# Patient Record
Sex: MALE | Race: WHITE | Employment: OTHER | URBAN - METROPOLITAN AREA
[De-identification: names, ages, dates, MRNs, and addresses within clinical notes are randomized per-mention and may not be internally consistent; named-entity substitution may affect disease eponyms.]

---

## 2017-12-15 PROBLEM — D70.8 OTHER NEUTROPENIA (HCC): Status: ACTIVE | Noted: 2017-12-15

## 2018-12-18 PROBLEM — D61.818 PANCYTOPENIA (HCC): Status: ACTIVE | Noted: 2018-12-18

## 2018-12-21 ENCOUNTER — TELEPHONE (OUTPATIENT)
Dept: CARDIOLOGY CLINIC | Age: 73
End: 2018-12-21

## 2018-12-21 NOTE — TELEPHONE ENCOUNTER
Left message patients home voicemail to have him call to schedule a new patient appointment with Dr. Judy Rousseau per Dr. Taz Marquez. Atul Daniels.

## 2018-12-21 NOTE — TELEPHONE ENCOUNTER
Referred by Dr Shea Roman for a fib/flutter. Will have PSR contact and scheduled new patient appt with Dr Karishma Martell.

## 2019-02-05 ENCOUNTER — OFFICE VISIT (OUTPATIENT)
Dept: CARDIOLOGY CLINIC | Age: 74
End: 2019-02-05

## 2019-02-05 VITALS
WEIGHT: 175.8 LBS | DIASTOLIC BLOOD PRESSURE: 80 MMHG | RESPIRATION RATE: 14 BRPM | HEART RATE: 54 BPM | SYSTOLIC BLOOD PRESSURE: 126 MMHG | BODY MASS INDEX: 26.04 KG/M2 | HEIGHT: 69 IN | OXYGEN SATURATION: 97 %

## 2019-02-05 DIAGNOSIS — R42 DIZZINESS: ICD-10-CM

## 2019-02-05 DIAGNOSIS — I10 ESSENTIAL HYPERTENSION: ICD-10-CM

## 2019-02-05 DIAGNOSIS — R00.1 SINUS BRADYCARDIA BY ELECTROCARDIOGRAM: ICD-10-CM

## 2019-02-05 DIAGNOSIS — I48.0 PAF (PAROXYSMAL ATRIAL FIBRILLATION) (HCC): Primary | ICD-10-CM

## 2019-02-05 NOTE — PROGRESS NOTES
Cardiac Electrophysiology OFFICE Consultation Note     Subjective:      Christiane Field is a 74 y.o. patient who is seen for evaluation of paroxysmal atrial fibrillation. The patient was routinely seen by Dr. Argenis Irving in the office for annual check up and EKG showed atrial fibrillation. He felt some irregular heart beat and dizzy spells especially when he was outside in the hot weather. He is on Lisinopril for blood pressure control. Today, his rate is regular in sinus at 54 beats per minute. His wife is with him today. Dr. Argenis Irving kindly referred him to me. The patient's father had a stroke in the past but he does not know if he had that due to atrial fibrillation. He remembers his mother said his father was taking Coumadin at some point and it was difficult to recollect. Patient Active Problem List   Diagnosis Code    HTN (hypertension) I10    Panic disorder F41.0    Hyperlipidemia E78.5    BPH (benign prostatic hyperplasia) N40.0    Bilateral inguinal hernia K40.20    Bradycardia, sinus R00.1    Other neutropenia (HCC) D70.8    Pancytopenia (HCC) J30.783     Current Outpatient Medications   Medication Sig Dispense Refill    rivaroxaban (XARELTO) 20 mg tab tablet Take 1 Tab by mouth daily (with dinner). 90 Tab 1    tamsulosin (FLOMAX) 0.4 mg capsule Take 1 Cap by mouth daily. 30 Cap 11    sertraline (ZOLOFT) 100 mg tablet TAKE 1 TABLET BY MOUTH EVERY DAY 90 Tab 4    gentamicin (GARAMYCIN) 0.3 % ophthalmic solution Administer 1 Drop to both eyes every four (4) hours.  1 Bottle 1    sildenafil citrate (VIAGRA) 100 mg tablet TAKE ONE TABLET BY MOUTH AS NEEDED 3 Each 10     No Known Allergies  Past Medical History:   Diagnosis Date    Bilateral inguinal hernia 12/26/2013    Bradycardia, sinus 12/21/2015    HTN (hypertension) 9/24/2011    Hyperlipidemia 9/24/2011    Hypertension     Inguinal hernia     Other ill-defined conditions(799.89)     high cholesterol    Other ill-defined conditions(799.89)     benign prostatic hypertrophy    Other neutropenia ([facility] [address] 12/15/2017    Since  with neg JESSY and normal B12    Pancytopenia ([facility] [address] 2018    Panic disorder 2011     Past Surgical History:   Procedure Laterality Date    HX KNEE ARTHROSCOPY      HX UROLOGICAL      benign prostate bx     Family History   Problem Relation Age of Onset    Stroke Father          76 cva     Social History     Tobacco Use    Smoking status: Former Smoker    Smokeless tobacco: Never Used   Substance Use Topics    Alcohol use: Yes     Comment: moderate        Review of Systems:   Constitutional: Negative for fever, chills, weight loss, malaise/fatigue. HEENT: Negative for nosebleeds, vision changes. Respiratory: Negative for cough, hemoptysis  Cardiovascular: Negative for chest pain, + palpitations, dizziness and no orthopnea, claudication, leg swelling, syncope, and PND. Gastrointestinal: Negative for nausea, vomiting, diarrhea, blood in stool and melena. Genitourinary: Negative for dysuria, and hematuria. Musculoskeletal: Negative for myalgias, + arthralgia. Skin: Negative for rash. Heme: Does not bleed or bruise easily. Neurological: Negative for speech change and focal weakness     Objective:     Visit Vitals  /80 (BP 1 Location: Left arm, BP Patient Position: Sitting)   Pulse (!) 54   Resp 14   Ht 5' 9\" (1.753 m)   Wt 175 lb 12.8 oz (79.7 kg)   SpO2 97%   BMI 25.96 kg/m²      Physical Exam:   Constitutional: well-developed and well-nourished. No respiratory distress. Head: Normocephalic and atraumatic. Eyes: Pupils are equal, round  ENT: hearing normal  Neck: supple. No JVD present. Cardiovascular: slow rate, regular rhythm. Exam reveals no gallop and no friction rub. No murmur heard. Pulmonary/Chest: Effort normal and breath sounds normal. No wheezes. Abdominal: Soft, no tenderness. Musculoskeletal: no edema. Neurological: alert,oriented.    Skin: Skin is warm and dry  Psychiatric: normal mood and affect. Behavior is normal. Judgment and thought content normal.         Assessment/Plan:       ICD-10-CM ICD-9-CM    1. PAF (paroxysmal atrial fibrillation) ([facility]) I48.0 427.31 rivaroxaban (XARELTO) 20 mg tab tablet      ECHO ADULT COMPLETE   2. Dizziness R42 780.4 rivaroxaban (XARELTO) 20 mg tab tablet      ECHO ADULT COMPLETE   3. Sinus bradycardia by electrocardiogram R00.1 427.89 rivaroxaban (XARELTO) 20 mg tab tablet      ECHO ADULT COMPLETE   4. Essential hypertension I10 401.9 rivaroxaban (XARELTO) 20 mg tab tablet      ECHO ADULT COMPLETE     reviewed diet, exercise and weight control  reviewed medications and side effects in detail   I explained and discussed with him and his wife about paroxysmal atrial fibrillation and risk of stroke, cardiomyopathy and heart failure. I recommended a 2-D echocardiogram to evaluate left ventricular size and left ventricular function. He has sinus bradycardia and would not tolerate rate control medication. His dizziness may be related to orthostatic hypotension with previous lisinopril, but if this is a recurrent problem I would think this comes from slow rate, he will need a pacemaker. The patient agreed to try Xarelto if it is approved [employer] and will stop aspirin. Future Appointments   Date Time Provider Marielos Osorio   6/18/2019 10:15 AM Tigist Ervin MD [facility] SCHED   8/20/2019 10:20 AM Rosemary Morales  [address]         Thank you for involving me in this patient's care and please call with further concerns or questions. Tina Pham M.D.   Electrophysiology/Cardiology  [facility] [place]  [address], [address]  [place], [facility] [address]                             [address] [place] [address]  [phone]

## 2019-02-08 ENCOUNTER — CLINICAL SUPPORT (OUTPATIENT)
Dept: CARDIOLOGY CLINIC | Age: 74
End: 2019-02-08

## 2019-02-08 VITALS
SYSTOLIC BLOOD PRESSURE: 126 MMHG | WEIGHT: 175 LBS | HEIGHT: 69 IN | BODY MASS INDEX: 25.92 KG/M2 | DIASTOLIC BLOOD PRESSURE: 80 MMHG

## 2019-02-08 DIAGNOSIS — R00.1 SINUS BRADYCARDIA BY ELECTROCARDIOGRAM: ICD-10-CM

## 2019-02-08 DIAGNOSIS — I48.0 PAF (PAROXYSMAL ATRIAL FIBRILLATION) (HCC): ICD-10-CM

## 2019-02-08 DIAGNOSIS — I10 ESSENTIAL HYPERTENSION: ICD-10-CM

## 2019-02-08 DIAGNOSIS — R42 DIZZINESS: ICD-10-CM

## 2019-02-09 LAB
ECHO AO ASC DIAM: 3.54 CM
ECHO AO ROOT DIAM: 3.66 CM
ECHO AV AREA PEAK VELOCITY: 2.1 CM2
ECHO AV AREA VTI: 2.4 CM2
ECHO AV AREA/BSA PEAK VELOCITY: 1.1 CM2/M2
ECHO AV AREA/BSA VTI: 1.2 CM2/M2
ECHO AV MEAN GRADIENT: 3.3 MMHG
ECHO AV PEAK GRADIENT: 6.3 MMHG
ECHO AV PEAK VELOCITY: 125.2 CM/S
ECHO AV VTI: 25.58 CM
ECHO LA AREA 4C: 21.5 CM2
ECHO LA MAJOR AXIS: 5.19 CM
ECHO LA TO AORTIC ROOT RATIO: 1.42
ECHO LA VOL 2C: 95.48 ML (ref 18–58)
ECHO LA VOL 4C: 67.17 ML (ref 18–58)
ECHO LA VOL BP: 87.94 ML (ref 18–58)
ECHO LA VOL/BSA BIPLANE: 45.06 ML/M2 (ref 16–28)
ECHO LA VOLUME INDEX A2C: 48.92 ML/M2 (ref 16–28)
ECHO LA VOLUME INDEX A4C: 34.42 ML/M2 (ref 16–28)
ECHO LV E' LATERAL VELOCITY: 5.98 CM/S
ECHO LV E' SEPTAL VELOCITY: 4.46 CM/S
ECHO LV EDV A2C: 124.3 ML
ECHO LV EDV A4C: 120 ML
ECHO LV EDV BP: 124.8 ML (ref 67–155)
ECHO LV EDV INDEX A4C: 61.5 ML/M2
ECHO LV EDV INDEX BP: 63.9 ML/M2
ECHO LV EDV NDEX A2C: 63.7 ML/M2
ECHO LV EJECTION FRACTION A2C: 54 %
ECHO LV EJECTION FRACTION A4C: 54 %
ECHO LV EJECTION FRACTION BIPLANE: 54.7 % (ref 55–100)
ECHO LV ESV A2C: 56.8 ML
ECHO LV ESV A4C: 54.7 ML
ECHO LV ESV BP: 56.6 ML (ref 22–58)
ECHO LV ESV INDEX A2C: 29.1 ML/M2
ECHO LV ESV INDEX A4C: 28 ML/M2
ECHO LV ESV INDEX BP: 29 ML/M2
ECHO LV INTERNAL DIMENSION DIASTOLIC: 4.55 CM (ref 4.2–5.9)
ECHO LV INTERNAL DIMENSION SYSTOLIC: 2.92 CM
ECHO LV IVSD: 1.14 CM (ref 0.6–1)
ECHO LV MASS 2D: 261.9 G (ref 88–224)
ECHO LV MASS INDEX 2D: 134.2 G/M2 (ref 49–115)
ECHO LV POSTERIOR WALL DIASTOLIC: 1.42 CM (ref 0.6–1)
ECHO LVOT DIAM: 2.06 CM
ECHO LVOT PEAK GRADIENT: 2.6 MMHG
ECHO LVOT PEAK VELOCITY: 80.51 CM/S
ECHO LVOT SV: 60.2 ML
ECHO LVOT VTI: 18 CM
ECHO MV A VELOCITY: 64.75 CM/S
ECHO MV E DECELERATION TIME (DT): 391.5 MS
ECHO MV E VELOCITY: 0.47 CM/S
ECHO MV E/A RATIO: 0.01
ECHO MV E/E' LATERAL: 0.08
ECHO MV E/E' RATIO (AVERAGED): 0.09
ECHO MV E/E' SEPTAL: 0.11
ECHO PV MAX VELOCITY: 84.18 CM/S
ECHO PV PEAK GRADIENT: 2.8 MMHG
ECHO RV INTERNAL DIMENSION: 4.07 CM
ECHO TV REGURGITANT MAX VELOCITY: 283.39 CM/S
ECHO TV REGURGITANT PEAK GRADIENT: 32.1 MMHG

## 2019-02-13 ENCOUNTER — PATIENT MESSAGE (OUTPATIENT)
Dept: CARDIOLOGY CLINIC | Age: 74
End: 2019-02-13

## 2019-08-01 DIAGNOSIS — I48.0 PAF (PAROXYSMAL ATRIAL FIBRILLATION) (HCC): ICD-10-CM

## 2019-08-01 DIAGNOSIS — I10 ESSENTIAL HYPERTENSION: ICD-10-CM

## 2019-08-01 DIAGNOSIS — R42 DIZZINESS: ICD-10-CM

## 2019-08-01 DIAGNOSIS — R00.1 SINUS BRADYCARDIA BY ELECTROCARDIOGRAM: ICD-10-CM

## 2019-08-05 RX ORDER — RIVAROXABAN 20 MG/1
TABLET, FILM COATED ORAL
Qty: 90 TAB | Refills: 0 | Status: SHIPPED | OUTPATIENT
Start: 2019-08-05 | End: 2019-10-31 | Stop reason: SDUPTHER

## 2019-08-05 NOTE — TELEPHONE ENCOUNTER
Cardiologist: Dr. Phoenix Ruiz    Last appt: 2/8/2019  Future Appointments   Date Time Provider Marielos Osorio   8/20/2019 10:20 AM Emilia Castillo  [address] 12/20/2019  9:30 AM Jeni Chen MD [facility] SCHED       Requested Prescriptions     Signed Prescriptions Disp Refills    XARELTO 20 mg tab tablet 90 Tab 0     Sig: TAKE 1 TABLET BY MOUTH DAILY WITH DINNER     Authorizing Provider: KRISH DEUTSCH     Ordering User: FRANK THOMSON         Refills VO per Dr. Phoenix Ruiz.

## 2019-08-20 ENCOUNTER — OFFICE VISIT (OUTPATIENT)
Dept: CARDIOLOGY CLINIC | Age: 74
End: 2019-08-20

## 2019-08-20 VITALS
WEIGHT: 174 LBS | HEART RATE: 64 BPM | RESPIRATION RATE: 16 BRPM | HEIGHT: 69 IN | BODY MASS INDEX: 25.77 KG/M2 | DIASTOLIC BLOOD PRESSURE: 80 MMHG | SYSTOLIC BLOOD PRESSURE: 130 MMHG | OXYGEN SATURATION: 98 %

## 2019-08-20 DIAGNOSIS — R42 DIZZINESS: ICD-10-CM

## 2019-08-20 DIAGNOSIS — I48.0 PAF (PAROXYSMAL ATRIAL FIBRILLATION) (HCC): Primary | ICD-10-CM

## 2019-08-20 DIAGNOSIS — R00.1 SINUS BRADYCARDIA BY ELECTROCARDIOGRAM: ICD-10-CM

## 2019-08-20 DIAGNOSIS — I10 ESSENTIAL HYPERTENSION: ICD-10-CM

## 2019-08-20 NOTE — PROGRESS NOTES
Cardiac Electrophysiology OFFICE Note     Subjective:      Galo Chery is a 66 y.o. patient who is seen for evaluation of paroxysmal atrial fibrillation. The patient was routinely seen by Dr. Marquis Jeffries in the office for annual check up and EKG showed atrial fibrillation So he was kindly referred. He felt some irregular heart beat and dizzy spells especially when he was outside in the hot weather. He is on Lisinopril for blood pressure control. Today, his rate is regular in sinus at > 60 beats per minute. His wife is with him today. He used to have sinus bradycardia 54 bpm  He is able to do yard work and does not feel dizziness or palpitation recently     The patient's father had a stroke in the past but he does not know if he had that due to atrial fibrillation. He remembers his mother said his father was taking Coumadin at some point and it was difficult to recollect. His [employer] covers xarelto better  His monthly payment is 30$    02/08/19   ECHO ADULT COMPLETE 02/09/2019 2/9/2019    Narrative · Calculated left ventricular ejection fraction is 55%. Biplane method   used to measure ejection fraction. Left ventricular mild concentric   hypertrophy. Normal left ventricular wall motion, no regional wall motion   abnormality noted. Normal left ventricular strain. Mild (grade 1) left   ventricular diastolic dysfunction. · Mild tricuspid valve regurgitation is present. · Mild pulmonic valve regurgitation is present. · Aortic valve sclerosis with no significant stenosis. Mild aortic valve   regurgitation is present. · Mild mitral valve regurgitation. · Left atrial cavity size is moderately dilated.         Signed by: Radha Thao MD         Patient Active Problem List   Diagnosis Code    HTN (hypertension) I10    Panic disorder F41.0    Hyperlipidemia E78.5    BPH (benign prostatic hyperplasia) N40.0    Bilateral inguinal hernia K40.20    Bradycardia, sinus R00.1    Other neutropenia (HCC) D70.8  Pancytopenia ([facility]) D61.818    Atrial fibrillation, chronic (HCC) I48.2     Current Outpatient Medications   Medication Sig Dispense Refill    XARELTO 20 mg tab tablet TAKE 1 TABLET BY MOUTH DAILY WITH DINNER 90 Tab 0    tamsulosin (FLOMAX) 0.4 mg capsule TAKE 1 CAPSULE BY MOUTH DAILY 90 Cap 11    sertraline (ZOLOFT) 100 mg tablet TAKE 1 TABLET BY MOUTH EVERY DAY 90 Tab 4    gentamicin (GARAMYCIN) 0.3 % ophthalmic solution Administer 1 Drop to both eyes every four (4) hours. 1 Bottle 1    sildenafil citrate (VIAGRA) 100 mg tablet TAKE ONE TABLET BY MOUTH AS NEEDED 3 Each 10     No Known Allergies  Past Medical History:   Diagnosis Date    Atrial fibrillation, chronic (HCC)     Bilateral inguinal hernia 2013    Bradycardia, sinus 2015    HTN (hypertension) 2011    Hyperlipidemia 2011    Hypertension     Inguinal hernia     Other ill-defined conditions(799.89)     high cholesterol    Other ill-defined conditions(799.89)     benign prostatic hypertrophy    Other neutropenia ([address] 12/15/2017    Since  with neg JESSY and normal B12    Pancytopenia ([address] 2018    Panic disorder 2011     Past Surgical History:   Procedure Laterality Date    HX KNEE ARTHROSCOPY      HX UROLOGICAL      benign prostate bx     Family History   Problem Relation Age of Onset    Stroke Father          76 cva     Social History     Tobacco Use    Smoking status: Former Smoker    Smokeless tobacco: Never Used   Substance Use Topics    Alcohol use: Yes     Comment: moderate        Review of Systems:   Constitutional: Negative for fever, chills, weight loss, malaise/fatigue. HEENT: Negative for nosebleeds, vision changes. Respiratory: Negative for cough, hemoptysis  Cardiovascular: Negative for chest pain, palpitations, dizziness and no orthopnea, claudication, leg swelling, syncope, and PND. Gastrointestinal: Negative for nausea, vomiting, diarrhea, blood in stool and melena. Genitourinary: Negative for dysuria, and hematuria. Musculoskeletal: Negative for myalgias, + arthralgia. Skin: Negative for rash. Heme: Does not bleed or bruise easily. Neurological: Negative for speech change and focal weakness     Objective:     Visit Vitals  /80 (BP 1 Location: Left arm, BP Patient Position: Sitting)   Pulse 64   Resp 16   Ht 5' 9\" (1.753 m)   Wt 174 lb (78.9 kg)   SpO2 98%   BMI 25.70 kg/m²      Physical Exam:   Constitutional: well-developed and well-nourished. No respiratory distress. Head: Normocephalic and atraumatic. Eyes: Pupils are equal, round  ENT: hearing normal  Neck: supple. No JVD present. Cardiovascular: normal rate, regular rhythm. Exam reveals no gallop and no friction rub. No murmur heard. Pulmonary/Chest: Effort normal and breath sounds normal. No wheezes. Abdominal: Soft, no tenderness. Musculoskeletal: no edema. Neurological: alert,oriented. Skin: Skin is warm and dry  Psychiatric: normal mood and affect. Behavior is normal. Judgment and thought content normal.         Assessment/Plan:       ICD-10-CM ICD-9-CM    1. PAF (paroxysmal atrial fibrillation) ([facility]) I48.0 427.31    2. Dizziness R42 780.4    3. Sinus bradycardia by electrocardiogram R00.1 427.89    4. Essential hypertension I10 401.9      reviewed diet, exercise and weight control  reviewed medications and side effects in detail   I explained and discussed with him and his wife about paroxysmal atrial fibrillation and risk of stroke, cardiomyopathy and heart failure.    The patient agreed to continue with Xarelto for now  LAE put him at risk of PAF   If RVR with PAF he needs med and then would have more sinus bradycardia and may need dual chamber pacer  Last renal lab test normal in 2018 Dec      Future Appointments   Date Time Provider Marielos Fitzpatricki   12/20/2019  9:30 AM Dane Kinney MD [facility]     He would like to come back 6 months    Thank you for involving me in this patient's care and please call with further concerns or questions. Anthony Reyez M.D.   Electrophysiology/Cardiology  [facility] [place]  [facility] [address], [facility] [address] [facility], [facility] Jessica [address] [place] [address]  [phone]

## 2019-10-31 DIAGNOSIS — I48.0 PAF (PAROXYSMAL ATRIAL FIBRILLATION) (HCC): ICD-10-CM

## 2019-10-31 DIAGNOSIS — I10 ESSENTIAL HYPERTENSION: ICD-10-CM

## 2019-10-31 DIAGNOSIS — R00.1 SINUS BRADYCARDIA BY ELECTROCARDIOGRAM: ICD-10-CM

## 2019-10-31 DIAGNOSIS — R42 DIZZINESS: ICD-10-CM

## 2019-10-31 RX ORDER — RIVAROXABAN 20 MG/1
TABLET, FILM COATED ORAL
Qty: 90 TAB | Refills: 1 | Status: SHIPPED | OUTPATIENT
Start: 2019-10-31 | End: 2020-03-31

## 2019-10-31 NOTE — TELEPHONE ENCOUNTER
Request for Xarelto 20 mg daily. Last office visit 8/20/19, next office visit 2/25/20. Refills per verbal order from Dr. Eduardo Villanueva.

## 2020-02-25 ENCOUNTER — OFFICE VISIT (OUTPATIENT)
Dept: CARDIOLOGY CLINIC | Age: 75
End: 2020-02-25

## 2020-02-25 VITALS
HEART RATE: 58 BPM | SYSTOLIC BLOOD PRESSURE: 162 MMHG | WEIGHT: 178 LBS | BODY MASS INDEX: 26.36 KG/M2 | DIASTOLIC BLOOD PRESSURE: 84 MMHG | HEIGHT: 69 IN

## 2020-02-25 DIAGNOSIS — I48.0 PAF (PAROXYSMAL ATRIAL FIBRILLATION) (HCC): Primary | ICD-10-CM

## 2020-02-25 DIAGNOSIS — R00.1 SINUS BRADYCARDIA BY ELECTROCARDIOGRAM: ICD-10-CM

## 2020-02-25 DIAGNOSIS — I10 ESSENTIAL HYPERTENSION: ICD-10-CM

## 2020-02-25 DIAGNOSIS — R42 DIZZINESS: ICD-10-CM

## 2020-02-25 NOTE — PATIENT INSTRUCTIONS
You will be scheduled for a Stress Echocardiogram after your appointment today. Please wear comfortable clothing (shorts or pants with a shirt or blouse) and walking/athletic shoes. Do not eat or drink anything, except water, for at least 2 hours prior to your test. 
 
Do take your scheduled medications prior to your test. 
 
You will need to follow up in clinic with Dr. Ric Munoz in 6 months.

## 2020-02-25 NOTE — PROGRESS NOTES
Cardiac Electrophysiology OFFICE Note Subjective:  
  
Cecily Mccurdy. is a 66 y.o. patient who is seen for follow up of paroxysmal atrial fibrillation. No known recurrence of AF. He states he has typically not had many symptoms associated with it. Tendency for bradycardia. He does note fatigue, some mildly worsening dyspnea with exertion. He denies palpitations, chest pain, PND, orthopnea, lightheadedness, or syncope. Occasional trace lower extremity edema. BP elevated, but states was very stressed getting here today. Anticoagulated with Xarelto, denies bleeding issues. Previously followed by Dr. Jose Maria, has not yet established care with new PCP. Previous: 
Echo (02/08/2019): LVEF 55%, mild LVH, grade 1 diastolic dysfunction. LA mod dilated. Mild MR. Mild AR. Mild TR. Mild OR. Mild short term memory deficit per PCP notes. Father had CVA. Patient Active Problem List  
Diagnosis Code  
 HTN (hypertension) I10  
 Panic disorder F41.0  Hyperlipidemia E78.5  BPH (benign prostatic hyperplasia) N40.0  Bilateral inguinal hernia K40.20  Bradycardia, sinus R00.1  Other neutropenia ([facility] [address] D70.8  Pancytopenia ([facility] [address] Z15.718  Atrial fibrillation, chronic I48.20  PAF (paroxysmal atrial fibrillation) ([facility]) I48.0 Current Outpatient Medications Medication Sig Dispense Refill  tamsulosin (FLOMAX) 0.4 mg capsule Take 2 Caps by mouth daily. 180 Cap 11  XARELTO 20 mg tab tablet TAKE 1 TABLET BY MOUTH DAILY WITH DINNER 90 Tab 1  
 sertraline (ZOLOFT) 100 mg tablet TAKE 1 TABLET BY MOUTH EVERY DAY 90 Tab 4 No Known Allergies Past Medical History:  
Diagnosis Date  Atrial fibrillation, chronic  Bilateral inguinal hernia 12/26/2013  Bradycardia, sinus 12/21/2015  
 HTN (hypertension) 9/24/2011  Hyperlipidemia 9/24/2011  Hypertension  Inguinal hernia  Other ill-defined conditions(799.89)   
 high cholesterol  Other ill-defined conditions(799.89)   
 benign prostatic hypertrophy  Other neutropenia ([facility] [address] 12/15/2017 Since  with neg JESSY and normal B12  
 PAF (paroxysmal atrial fibrillation) ([facility] [address]  Pancytopenia ([facility] [address] 2018  Panic disorder 2011 Past Surgical History:  
Procedure Laterality Date  HX KNEE ARTHROSCOPY    
 HX UROLOGICAL    
 benign prostate bx Family History Problem Relation Age of Onset  Stroke Father   
      76 cva Social History Tobacco Use  Smoking status: Former Smoker  Smokeless tobacco: Never Used Substance Use Topics  Alcohol use: Yes Comment: moderate Review of Systems:  
Constitutional: Negative for fever, chills, weight loss, + malaise/fatigue. HEENT: Negative for nosebleeds, vision changes. Respiratory: Negative for cough, hemoptysis Cardiovascular: Negative for chest pain, palpitations, dizziness and no orthopnea, claudication, leg swelling, syncope, and PND. + BOLANOS Gastrointestinal: Negative for nausea, vomiting, diarrhea, blood in stool and melena. Genitourinary: Negative for dysuria, and hematuria. Musculoskeletal: Negative for myalgias, + bilateral knee arthralgia. Skin: Negative for rash. Heme: Does not bleed or bruise easily. Neurological: Negative for speech change and focal weakness Objective:  
 
Visit Vitals /84 Pulse (!) 58 Ht 5' 9\" (1.753 m) Wt 178 lb (80.7 kg) BMI 26.29 kg/m² Physical Exam:  
Constitutional: Well-developed and well-nourished. No respiratory distress. Head: Normocephalic and atraumatic. Eyes: Pupils are equal, round. Wearing glasses. ENT: Hearing grossly normal. 
Neck: Supple. No JVD present. Cardiovascular: Slightly bradycardic rate, regular rhythm. Exam reveals no gallop and no friction rub. No murmur heard. Pulmonary/Chest: Effort normal and breath sounds normal. No wheezes. Abdominal: Soft, no tenderness. Musculoskeletal: No edema. Neurological: Alert,oriented. Skin: Skin is warm and dry. Psychiatric: Normal mood and affect. Behavior is normal. Judgment and thought content normal.   
 
ECG (12/23/2019): SB 52 bpm with frequent PVCs. Assessment/Plan: ICD-10-CM ICD-9-CM 1. PAF (paroxysmal atrial fibrillation) ([facility]) I48.0 427.31   
2. Essential hypertension I10 401.9 3. Dizziness R42 780.4 4. Sinus bradycardia by electrocardiogram R00.1 427.89   
 
Mr. Estela Goodell has not had known recurrence of PAF. Should he have RVR in the future, would consider medication for rate/rhythm control. Bradycardia would limit options. History of mild bradycardia. Increased BOLANOS, some fatigue. Will obtain stress echo to evaluate for chronotropic incompetence vs ischemia. If chronotropic incompetence is noted, would recommend pacemaker. If ischemia is suspected, would then recommend cardiac cath for further evaluation. BP elevated today, but states very stressed getting here. BP has historically been well controlled over past 2 years. He will check BP at home, keep a log, call if consistently >140/90 mmHg. Continue Xarelto for embolic CVA prophylaxis. Follow up in 6 months. No future appointments. Thank you for involving me in this patient's care and please call with further concerns or questions. Wolfgang Merlin, M.D. Electrophysiology/Cardiology [address] [facility] [place] [address], [address] [place] [address] [facility] [address] 
[phone]

## 2020-02-27 NOTE — PROGRESS NOTES
Cardiac Electrophysiology OFFICE Note     Subjective:      Jan Godfrey is a 66 y.o. patient who is seen for follow up of paroxysmal atrial fibrillation. No known recurrence of AF. He states he has typically not had many symptoms associated with it. Tendency for bradycardia. He does note fatigue, some mildly worsening dyspnea with exertion. He denies palpitations, chest pain, PND, orthopnea, lightheadedness, or syncope. Occasional trace lower extremity edema. BP elevated, but states was very stressed getting here today. Anticoagulated with Xarelto, denies bleeding issues. Previously followed by Dr. Kellee Henry, has not yet established care with new PCP. Previous:  Echo (02/08/2019): LVEF 55%, mild LVH, grade 1 diastolic dysfunction. LA mod dilated. Mild MR. Mild AR. Mild TR. Mild ME. Mild short term memory deficit per PCP notes. Father had CVA. Patient Active Problem List   Diagnosis Code    HTN (hypertension) I10    Panic disorder F41.0    Hyperlipidemia E78.5    BPH (benign prostatic hyperplasia) N40.0    Bilateral inguinal hernia K40.20    Bradycardia, sinus R00.1    Other neutropenia ([facility]) D70.8    Pancytopenia ([facility]) D61.818    Atrial fibrillation, chronic I48.20    PAF (paroxysmal atrial fibrillation) ([facility]) I48.0     Current Outpatient Medications   Medication Sig Dispense Refill    tamsulosin (FLOMAX) 0.4 mg capsule Take 2 Caps by mouth daily.  180 Cap 11    XARELTO 20 mg tab tablet TAKE 1 TABLET BY MOUTH DAILY WITH DINNER 90 Tab 1    sertraline (ZOLOFT) 100 mg tablet TAKE 1 TABLET BY MOUTH EVERY DAY 90 Tab 4     No Known Allergies  Past Medical History:   Diagnosis Date    Atrial fibrillation, chronic     Bilateral inguinal hernia 12/26/2013    Bradycardia, sinus 12/21/2015    HTN (hypertension) 9/24/2011    Hyperlipidemia 9/24/2011    Hypertension     Inguinal hernia     Other ill-defined conditions(799.89)     high cholesterol    Other ill-defined conditions(799.89)     benign prostatic hypertrophy    Other neutropenia ([facility] 12/15/2017    Since  with neg JESSY and normal B12    PAF (paroxysmal atrial fibrillation) (HCC)     Pancytopenia ([facility] [address] 2018    Panic disorder 2011     Past Surgical History:   Procedure Laterality Date    HX KNEE ARTHROSCOPY      HX UROLOGICAL      benign prostate bx     Family History   Problem Relation Age of Onset    Stroke Father          76 cva     Social History     Tobacco Use    Smoking status: Former Smoker    Smokeless tobacco: Never Used   Substance Use Topics    Alcohol use: Yes     Comment: moderate        Review of Systems:   Constitutional: Negative for fever, chills, weight loss, + malaise/fatigue. HEENT: Negative for nosebleeds, vision changes. Respiratory: Negative for cough, hemoptysis  Cardiovascular: Negative for chest pain, palpitations, dizziness and no orthopnea, claudication, leg swelling, syncope, and PND. + BOLANOS  Gastrointestinal: Negative for nausea, vomiting, diarrhea, blood in stool and melena. Genitourinary: Negative for dysuria, and hematuria. Musculoskeletal: Negative for myalgias, + bilateral knee arthralgia. Skin: Negative for rash. Heme: Does not bleed or bruise easily. Neurological: Negative for speech change and focal weakness     Objective:     Visit Vitals  /84   Pulse (!) 58   Ht 5' 9\" (1.753 m)   Wt 178 lb (80.7 kg)   BMI 26.29 kg/m²      Physical Exam:   Constitutional: Well-developed and well-nourished. No respiratory distress. Head: Normocephalic and atraumatic. Eyes: Pupils are equal, round. Wearing glasses. ENT: Hearing grossly normal.  Neck: Supple. No JVD present. Cardiovascular: Slightly bradycardic rate, regular rhythm. Exam reveals no gallop and no friction rub. No murmur heard. Pulmonary/Chest: Effort normal and breath sounds normal. No wheezes. Abdominal: Soft, no tenderness. Musculoskeletal: No edema. Neurological: Alert,oriented. Skin: Skin is warm and dry. Psychiatric: Normal mood and affect. Behavior is normal. Judgment and thought content normal.      ECG (12/23/2019): SB 52 bpm with frequent PVCs. Assessment/Plan:       ICD-10-CM ICD-9-CM    1. PAF (paroxysmal atrial fibrillation) ([facility]) I48.0 427.31 ECHO STRESS   2. Essential hypertension I10 401.9 ECHO STRESS   3. Dizziness R42 780.4 ECHO STRESS   4. Sinus bradycardia by electrocardiogram R00.1 427.89 ECHO STRESS     Mr. Johnathan Sykes has not had known recurrence of PAF. Should he have RVR in the future, would consider medication for rate/rhythm control. Bradycardia would limit options. History of mild bradycardia. Increased BOLANOS, some fatigue. Will obtain stress echo to evaluate for chronotropic incompetence and to rule out ischemia. If chronotropic incompetence is noted, would recommend pacemaker. If ischemia is suspected, would then recommend cardiac cath for further evaluation. BP elevated today, but states very stressed getting here. BP has historically been well controlled over past 2 years. He will check BP at home, keep a log, call if consistently >140/90 mmHg. Continue Xarelto for embolic CVA prophylaxis. Follow up in 6 months.     Addendum:    Exercise stress echo 6/4/2020   Just over 3 minutes   HR 69%  Arthralgia  PVCs and couplets  Echo normal LVEF with trace MR, TR and mild AR    Given these results, I recommend lexiscan cardiolite stress test and if no ischemia, proceed with dual chamber pacemaker    lexiscan cardiolite stress test 6/22/2020 sinus bradycardia PVC and normal perfusion   LVEF 50%    Recommend dual chamber pacemaker    Future Appointments   Date Time Provider Marielos Osorio   3/17/2020  4:00 PM ECHO, [address]   3/17/2020  4:00 PM STRESSECHO, [address]   8/25/2020 10:40 AM Julia Patrick  [address]       Thank you for involving me in this patient's care and please call with further concerns or questions. Maverick Solo M.D.   Electrophysiology/Cardiology  [facility]  [address] [facility], [facility] Jessica [address] [place] [address]  [phone]

## 2020-03-30 DIAGNOSIS — R42 DIZZINESS: ICD-10-CM

## 2020-03-30 DIAGNOSIS — R00.1 SINUS BRADYCARDIA BY ELECTROCARDIOGRAM: ICD-10-CM

## 2020-03-30 DIAGNOSIS — I10 ESSENTIAL HYPERTENSION: ICD-10-CM

## 2020-03-30 DIAGNOSIS — I48.0 PAF (PAROXYSMAL ATRIAL FIBRILLATION) (HCC): ICD-10-CM

## 2020-03-31 RX ORDER — RIVAROXABAN 20 MG/1
TABLET, FILM COATED ORAL
Qty: 90 TAB | Refills: 1 | Status: SHIPPED | OUTPATIENT
Start: 2020-03-31 | End: 2020-10-05

## 2020-04-20 ENCOUNTER — TELEPHONE (OUTPATIENT)
Dept: CARDIOLOGY CLINIC | Age: 75
End: 2020-04-20

## 2020-04-20 NOTE — TELEPHONE ENCOUNTER
Dr. Job Smith,    Patient is scheduled for a stress echo on April 29. Can it be moved out to June or does it need to be done now? Please advise.

## 2020-04-24 NOTE — TELEPHONE ENCOUNTER
Spoke with patient.   Appt r/s for June 4  Future Appointments   Date Time Provider Marielos Osorio   6/4/2020  9:00 AM ANU, [address]   6/4/2020  9:00 AM VASCULAR, [address]   8/25/2020 10:40 AM Rob Story  [address]

## 2020-06-05 ENCOUNTER — TELEPHONE (OUTPATIENT)
Dept: CARDIOLOGY CLINIC | Age: 75
End: 2020-06-05

## 2020-06-05 NOTE — TELEPHONE ENCOUNTER
----- Message from Gil Perez MD sent at 6/4/2020  4:27 PM EDT -----  Exercise stress echo 6/4/2020:    Just over 3 minutes of exercise  HR 69% of target so sub optimal stress level  Arthralgia prevented further walking  PVCs and couplets noted  Echo normal LVEF with trace MR, TR and mild AR    Given these results, I recommend lexiscan cardiolite stress test and if no ischemia, proceed with dual chamber pacemaker

## 2020-06-05 NOTE — TELEPHONE ENCOUNTER
Verified patient with two types of identifiers. Notified patient of results and MD recommendations. Scheduled patient for Ruby Ge. Reviewed testing instructions. Notified patient will send [employer] message with instructions as well. Patient verbalized understanding and will call with any other questions.       Future Appointments   Date Time Provider Marielos Osorio   6/12/2020 10:45 AM Elizabet Daley  [address]   6/22/2020 12:00 PM Luz Marina PAZ [facility]   8/25/2020 10:40 AM Halina Pro  [address]    1/6/2021  9:30 AM Elizabet Daley  [facility]

## 2020-06-19 ENCOUNTER — TELEPHONE (OUTPATIENT)
Dept: CARDIOLOGY CLINIC | Age: 75
End: 2020-06-19

## 2020-06-19 NOTE — TELEPHONE ENCOUNTER
Spoke with patient, identifiers x2. COVID prescreening performed. Screening negative.  [facility]

## 2020-06-24 ENCOUNTER — TELEPHONE (OUTPATIENT)
Dept: CARDIOLOGY CLINIC | Age: 75
End: 2020-06-24

## 2020-06-24 NOTE — TELEPHONE ENCOUNTER
Verified patient with two types of identifiers. Notified patient of results and MD recommendations. Patient states he will need think about it and call back to schedule. Patient verbalized understanding and will call with any other questions.

## 2020-06-24 NOTE — TELEPHONE ENCOUNTER
----- Message from Oscar Rivero MD sent at 6/23/2020  7:53 AM EDT -----  lexiscan cardiolite stress test 6/22/2020 sinus bradycardia PVC and normal perfusion   LVEF 50%    Recommend dual chamber pacemaker

## 2020-06-26 ENCOUNTER — TELEPHONE (OUTPATIENT)
Dept: CARDIOLOGY CLINIC | Age: 75
End: 2020-06-26

## 2020-06-26 DIAGNOSIS — R00.1 SINUS BRADYCARDIA BY ELECTROCARDIOGRAM: ICD-10-CM

## 2020-06-26 DIAGNOSIS — Z01.812 PRE-PROCEDURE LAB EXAM: Primary | ICD-10-CM

## 2020-06-26 RX ORDER — CHLORHEXIDINE GLUCONATE 4 G/100ML
SOLUTION TOPICAL
Qty: 1 BOTTLE | Refills: 0 | Status: SHIPPED | OUTPATIENT
Start: 2020-06-26 | End: 2020-07-10 | Stop reason: SDUPTHER

## 2020-06-26 NOTE — LETTER
6/26/2020 2:15 PM 
 
Mr. Danielle Perdomo. [address] Your Dual Chamber Pacemaker procedure has been scheduled for 7/20/20 at 10:45 am, at [facility]. 
 
Please report to Admitting Department by 8: 45 am, or 2 hours prior to your scheduled procedure (please see attached information for day of procedure). Please bring a list of your current medications and medication bottles, if able, to the hospital on this day. You will be unable to drive after your procedure so please make sure to bring someone with you to your procedure. You will need to have nothing to eat or drink after midnight, the night prior to your procedure. You may have small sips of water, if needed, to take with your medication. You will need labs drawn prior to your procedure. Please go to [facility] to have this done as soon as you are able. We have a [facility] in our building down the hallway from our office. You can have these done on the day of your appointment with El Marin NP You will also need to see Dr. Dariana Morales Nurse Practitioner, Danay Carey, in office prior to your procedure. An appointment has been scheduled for 7/10/20 at 10:20 am. 
 
You will also need to be tested for COVID-19 prior to your procedure. Please see attached list of testing sites. You do not need to call ahead or schedule an appointment. Please go get tested on 7/15/20. You should stop your medication, Xarelto, 2 days prior to your scheduled procedure. After your procedure, you will need to follow up with Dr. Dariana Morales nurse for a wound and device check. Your follow-up appointment has been scheduled for 7/31/20 at 10:45 am.  
 
Hibiclens 4% topical solution has been ordered and sent into your pharmacy Patient it start Hibiclens application 5 days prior to procedure date Directions Hibiclens 4%: Start cleanse 5 days prior to procedure 1. Rinse area (upper chest and upper arms) with water. 2. Apply minimum amount necessary to scrub the upper chest area from shoulder/neck to mid line of chest and to below the nipple each of  5 nights before the day of the procedure 3. Let solution dry.   
 
 
Sincerely, 
 
Leeann Valencia MD

## 2020-06-26 NOTE — TELEPHONE ENCOUNTER
Verified patient with two types of identifiers. Scheduled patient for a dual chamber pacemaker on 7/20/20 at 10:45 am. Reviewed pre procedure instructions but will also send via a [employer] message. Patient verbalized understanding and will call with any other questions.

## 2020-06-26 NOTE — TELEPHONE ENCOUNTER
Patient calling [address] [facility] to schedule a procedure to have a pacer put in    He can be reached at [phone]    [facility]

## 2020-07-06 NOTE — H&P (VIEW-ONLY)
Cardiac Electrophysiology OFFICE Note     Subjective:      Pino Villalta. is a 66 y.o. patient who is seen for H&P update prior to upcoming dual chamber pacemaker implant (scheduled 07/20/2020). PAF, no recent known recurrence. Occasionally symptomatic when it does occur. Tendency for bradycardia. He does note fatigue, some mildly worsening dyspnea with exertion. Stress echo in 06/2020 limited by arthralgia, & unable to meet target HR, but had PVCs & couplets with stress. Normal LVEF noted. Subsequent Lexiscan cardiolite stress test showed LVEF 50%, normal perfusion, sinus bradycardia. He denies palpitations, chest pain, PND, orthopnea, or syncope. No lower extremity edema. Anticoagulated with Xarelto, denies bleeding issues. Previous:  Lexiscan cardiolite stress (06/22/2020): LVEF 50%, normal myocardial perfusion. Sinus bradycardia at baseline. Stress echo (06/04/2020): Inadequate HR response, only able to exercise x 3 minutes due to arthralgia. Rare PVCs noted. No echocardiographic evidence of ischemia. Echo (02/08/2019): LVEF 55%, mild LrVH, grade 1 diastolic dysfunction. LA mod dilated. Mild MR. Mild AR. Mild TR. Mild [place]. Mild short term memory deficit per PCP notes. Father had CVA. Patient Active Problem List   Diagnosis Code    HTN (hypertension) I10    Panic disorder F41.0    Hyperlipidemia E78.5    BPH (benign prostatic hyperplasia) N40.0    Bilateral inguinal hernia K40.20    Bradycardia, sinus R00.1    Other neutropenia ([facility]) D70.8    Pancytopenia ([facility]) D61.818    Atrial fibrillation, chronic ([facility]) I48.20    PAF (paroxysmal atrial fibrillation) ([facility]) I48.0     Current Outpatient Medications   Medication Sig Dispense Refill    Xarelto 20 mg tab tablet TAKE 1 TABLET BY MOUTH DAILY WITH DINNER 90 Tab 1    tamsulosin (FLOMAX) 0.4 mg capsule Take 2 Caps by mouth daily.  180 Cap 11    sertraline (ZOLOFT) 100 mg tablet TAKE 1 TABLET BY MOUTH EVERY DAY 90 Tab 4    chlorhexidine (Hibiclens) 4 % liquid Apply to the upper chest area from shoulder/neck to mid line of chest and to below the nipple every day, 5 days prior to the procedure. 1 Bottle 0     No Known Allergies  Past Medical History:   Diagnosis Date    Arthritis     Left knee Probable OA    Atrial fibrillation, chronic (HCC)     Bilateral inguinal hernia 2013    Bradycardia, sinus 2015    HTN (hypertension) 2011    Hyperlipidemia 2011    Hypertension     Inguinal hernia     Other ill-defined conditions(799.89)     high cholesterol    Other ill-defined conditions(799.89)     benign prostatic hypertrophy    Other neutropenia ([address] 12/15/2017    Since  with neg JESSY and normal B12    PAF (paroxysmal atrial fibrillation) (HCC)     Pancytopenia ([address] 2018    Panic disorder 2011     Past Surgical History:   Procedure Laterality Date    HX KNEE ARTHROSCOPY      HX UROLOGICAL      benign prostate bx     Family History   Problem Relation Age of Onset    Stroke Father          76 cva     Social History     Tobacco Use    Smoking status: Former Smoker    Smokeless tobacco: Never Used   Substance Use Topics    Alcohol use: Yes     Comment: 1/2 drink weekly         Review of Systems:   Constitutional: Negative for fever, chills, weight loss, + malaise/fatigue. HEENT: Negative for nosebleeds, vision changes. Respiratory: Negative for cough, hemoptysis  Cardiovascular: Negative for chest pain, palpitations, dizziness and no orthopnea, claudication, leg swelling, syncope, and PND. + BOLANOS  Gastrointestinal: Negative for nausea, vomiting, diarrhea, blood in stool and melena. Genitourinary: Negative for dysuria, and hematuria. Musculoskeletal: Negative for myalgias, + bilateral knee arthralgia. Skin: Negative for rash. Heme: Does not bleed or bruise easily.    Neurological: Negative for speech change and focal weakness     Objective: Visit Vitals  /80 (BP 1 Location: Left arm, BP Patient Position: Sitting)   Pulse 70   Ht 5' 9\" (1.753 m)   Wt 171 lb 9.6 oz (77.8 kg)   SpO2 96%   BMI 25.34 kg/m²        Physical Exam:   Constitutional: Well-developed and well-nourished. No respiratory distress. Head: Normocephalic and atraumatic. Eyes: Pupils are equal, round. Wearing glasses. ENT: Hearing grossly normal.  Neck: Supple. No JVD present. Cardiovascular: Slightly bradycardic rate, regular rhythm. Exam reveals no gallop and no friction rub. No murmur heard. Pulmonary/Chest: Effort normal and breath sounds normal. No wheezes. Abdominal: Soft, no tenderness. Musculoskeletal: No edema. Neurological: Alert,oriented. Skin: Skin is warm and dry. Psychiatric: Normal mood and affect. Behavior is normal. Judgment and thought content normal.      ECG (12/23/2019): SB 52 bpm with frequent PVCs. Assessment/Plan:       ICD-10-CM ICD-9-CM    1. Paroxysmal atrial fibrillation (HCC)  I48.0 427.31    2. Bradycardia  R00.1 427.89    3. Dyspnea on exertion  R06.00 786.09    4. Fatigue, unspecified type  R53.83 780.79    5. Essential hypertension  I10 401.9        Mr. Arely Cadet has not had known recurrence of PAF. Should he have RVR in the future, would consider medication for rate/rhythm control. Current bradycardia would limit options. Increased BOLANOS & fatigue, but stress testing did not indicate ischemia. Inadequate HR response noted with exercise stress. BP well controlled. Risks/benefits of dual chamber pacemaker implant reviewed for SSS. He agrees to proceed as scheduled. Continue Xarelto for embolic CVA prophylaxis. Hold x 2 days prior to upcoming procedure. Hibiclens reviewed. He has lab slip, will have these done no later than 07/16/2020. Patient advised that COVID test is required <6 days prior to upcoming procedure. Patient will go to [facility] on 07/14/2020.       Future Appointments   Date Time Provider [place]   7/31/2020 10:45 AM PACEMAKER3, [address]   7/31/2020 11:00 AM HOLTER, LOIS GONZALES SCHED   8/25/2020 10:40 AM Octavio Hammans,  [address]   1/6/2021  9:30 AM Herve Olmos  [facility]     Thank you for involving me in this patient's care and please call with further concerns or questions.     ANAIS Jeffery  [facility] [place]  07/10/20

## 2020-07-06 NOTE — PROGRESS NOTES
Cardiac Electrophysiology OFFICE Note     Subjective:      Erik Mccarthy is a 66 y.o. patient who is seen for H&P update prior to upcoming dual chamber pacemaker implant (scheduled 07/20/2020). PAF, no recent known recurrence. Occasionally symptomatic when it does occur. Tendency for bradycardia. He does note fatigue, some mildly worsening dyspnea with exertion. Stress echo in 06/2020 limited by arthralgia, & unable to meet target HR, but had PVCs & couplets with stress. Normal LVEF noted. Subsequent Lexiscan cardiolite stress test showed LVEF 50%, normal perfusion, sinus bradycardia. He denies palpitations, chest pain, PND, orthopnea, or syncope. No lower extremity edema. Anticoagulated with Xarelto, denies bleeding issues. Previous:  Lexiscan cardiolite stress (06/22/2020): LVEF 50%, normal myocardial perfusion. Sinus bradycardia at baseline. Stress echo (06/04/2020): Inadequate HR response, only able to exercise x 3 minutes due to arthralgia. Rare PVCs noted. No echocardiographic evidence of ischemia. Echo (02/08/2019): LVEF 55%, mild LrVH, grade 1 diastolic dysfunction. LA mod dilated. Mild MR. Mild AR. Mild TR. Mild CA. Mild short term memory deficit per PCP notes. Father had CVA. Patient Active Problem List   Diagnosis Code    HTN (hypertension) I10    Panic disorder F41.0    Hyperlipidemia E78.5    BPH (benign prostatic hyperplasia) N40.0    Bilateral inguinal hernia K40.20    Bradycardia, sinus R00.1    Other neutropenia ([facility]) D70.8    Pancytopenia ([facility]) D61.818    Atrial fibrillation, chronic ([facility]) I48.20    PAF (paroxysmal atrial fibrillation) ([facility]) I48.0     Current Outpatient Medications   Medication Sig Dispense Refill    Xarelto 20 mg tab tablet TAKE 1 TABLET BY MOUTH DAILY WITH DINNER 90 Tab 1    tamsulosin (FLOMAX) 0.4 mg capsule Take 2 Caps by mouth daily.  180 Cap 11    sertraline (ZOLOFT) 100 mg tablet TAKE 1 TABLET BY MOUTH EVERY DAY 90 Tab 4    chlorhexidine (Hibiclens) 4 % liquid Apply to the upper chest area from shoulder/neck to mid line of chest and to below the nipple every day, 5 days prior to the procedure. 1 Bottle 0     No Known Allergies  Past Medical History:   Diagnosis Date    Arthritis     Left knee Probable OA    Atrial fibrillation, chronic (HCC)     Bilateral inguinal hernia 2013    Bradycardia, sinus 2015    HTN (hypertension) 2011    Hyperlipidemia 2011    Hypertension     Inguinal hernia     Other ill-defined conditions(799.89)     high cholesterol    Other ill-defined conditions(799.89)     benign prostatic hypertrophy    Other neutropenia ([address] 12/15/2017    Since  with neg JESSY and normal B12    PAF (paroxysmal atrial fibrillation) (HCC)     Pancytopenia ([address] 2018    Panic disorder 2011     Past Surgical History:   Procedure Laterality Date    HX KNEE ARTHROSCOPY      HX UROLOGICAL      benign prostate bx     Family History   Problem Relation Age of Onset    Stroke Father          76 cva     Social History     Tobacco Use    Smoking status: Former Smoker    Smokeless tobacco: Never Used   Substance Use Topics    Alcohol use: Yes     Comment: 1/2 drink weekly         Review of Systems:   Constitutional: Negative for fever, chills, weight loss, + malaise/fatigue. HEENT: Negative for nosebleeds, vision changes. Respiratory: Negative for cough, hemoptysis  Cardiovascular: Negative for chest pain, palpitations, dizziness and no orthopnea, claudication, leg swelling, syncope, and PND. + BOLANOS  Gastrointestinal: Negative for nausea, vomiting, diarrhea, blood in stool and melena. Genitourinary: Negative for dysuria, and hematuria. Musculoskeletal: Negative for myalgias, + bilateral knee arthralgia. Skin: Negative for rash. Heme: Does not bleed or bruise easily.    Neurological: Negative for speech change and focal weakness     Objective: Visit Vitals  /80 (BP 1 Location: Left arm, BP Patient Position: Sitting)   Pulse 70   Ht 5' 9\" (1.753 m)   Wt 171 lb 9.6 oz (77.8 kg)   SpO2 96%   BMI 25.34 kg/m²        Physical Exam:   Constitutional: Well-developed and well-nourished. No respiratory distress. Head: Normocephalic and atraumatic. Eyes: Pupils are equal, round. Wearing glasses. ENT: Hearing grossly normal.  Neck: Supple. No JVD present. Cardiovascular: Slightly bradycardic rate, regular rhythm. Exam reveals no gallop and no friction rub. No murmur heard. Pulmonary/Chest: Effort normal and breath sounds normal. No wheezes. Abdominal: Soft, no tenderness. Musculoskeletal: No edema. Neurological: Alert,oriented. Skin: Skin is warm and dry. Psychiatric: Normal mood and affect. Behavior is normal. Judgment and thought content normal.      ECG (12/23/2019): SB 52 bpm with frequent PVCs. Assessment/Plan:       ICD-10-CM ICD-9-CM    1. Paroxysmal atrial fibrillation (HCC)  I48.0 427.31    2. Bradycardia  R00.1 427.89    3. Dyspnea on exertion  R06.00 786.09    4. Fatigue, unspecified type  R53.83 780.79    5. Essential hypertension  I10 401.9        Mr. Washington Knight has not had known recurrence of PAF. Should he have RVR in the future, would consider medication for rate/rhythm control. Current bradycardia would limit options. Increased BOLANOS & fatigue, but stress testing did not indicate ischemia. Inadequate HR response noted with exercise stress. BP well controlled. Risks/benefits of dual chamber pacemaker implant reviewed for SSS. He agrees to proceed as scheduled. Continue Xarelto for embolic CVA prophylaxis. Hold x 2 days prior to upcoming procedure. Hibiclens reviewed. He has lab slip, will have these done no later than 07/16/2020. Patient advised that COVID test is required <6 days prior to upcoming procedure. Patient will go to [facility] on 07/14/2020.       Future Appointments   Date Time Provider Marielos Osorio   7/31/2020 10:45 AM PACEMAKER3, [address]   7/31/2020 11:00 AM HOLTERLOIS   8/25/2020 10:40 AM Ami Carrasco  [address]   1/6/2021  9:30 AM Amy Cruz  [address]     Thank you for involving me in this patient's care and please call with further concerns or questions. ANAIS Cruz [address] [facility] [place]  07/10/20      Addendum:  Atrial fibrillation  bpm, 46% burden, persistent on new pacemaker alert    Continue with xarelto  Add toprol XL 50 mg every day  Follow up when clinic slot allows     Current Outpatient Medications on File Prior to Visit   Medication Sig Dispense Refill    Xarelto 20 mg tab tablet TAKE 1 TABLET BY MOUTH DAILY WITH DINNER 90 Tab 1    tamsulosin (FLOMAX) 0.4 mg capsule Take 2 Caps by mouth daily. 180 Cap 11    sertraline (ZOLOFT) 100 mg tablet TAKE 1 TABLET BY MOUTH EVERY DAY 90 Tab 4     No current facility-administered medications on file prior to visit.           Future Appointments   Date Time Provider Marielos Osorio   11/3/2020 10:45 AM Clay Cole BS AMB   11/3/2020 11:20 AM MD GARCÍA De León BS AMB   1/6/2021  9:30 AM Amy Cruz  [address]

## 2020-07-10 ENCOUNTER — OFFICE VISIT (OUTPATIENT)
Dept: CARDIOLOGY CLINIC | Age: 75
End: 2020-07-10

## 2020-07-10 VITALS
DIASTOLIC BLOOD PRESSURE: 80 MMHG | SYSTOLIC BLOOD PRESSURE: 110 MMHG | HEIGHT: 69 IN | BODY MASS INDEX: 25.42 KG/M2 | HEART RATE: 70 BPM | OXYGEN SATURATION: 96 % | WEIGHT: 171.6 LBS

## 2020-07-10 DIAGNOSIS — R00.1 SINUS BRADYCARDIA BY ELECTROCARDIOGRAM: ICD-10-CM

## 2020-07-10 DIAGNOSIS — R06.09 DYSPNEA ON EXERTION: ICD-10-CM

## 2020-07-10 DIAGNOSIS — R00.1 BRADYCARDIA: ICD-10-CM

## 2020-07-10 DIAGNOSIS — Z01.812 PRE-PROCEDURE LAB EXAM: ICD-10-CM

## 2020-07-10 DIAGNOSIS — I10 ESSENTIAL HYPERTENSION: ICD-10-CM

## 2020-07-10 DIAGNOSIS — I48.0 PAROXYSMAL ATRIAL FIBRILLATION (HCC): Primary | ICD-10-CM

## 2020-07-10 DIAGNOSIS — R53.83 FATIGUE, UNSPECIFIED TYPE: ICD-10-CM

## 2020-07-10 RX ORDER — CHLORHEXIDINE GLUCONATE 4 G/100ML
SOLUTION TOPICAL
Qty: 1 BOTTLE | Refills: 0 | Status: SHIPPED | OUTPATIENT
Start: 2020-07-10 | End: 2020-07-20

## 2020-07-10 NOTE — PATIENT INSTRUCTIONS
Your procedure has been scheduled for 07/20/2020 at 10:45AM at [facility]. 
 
Please report to Admitting Department by 09:15AM, or 1.5 hours prior to your scheduled procedure. Please bring a list of your current medications and medication bottles, if able, to the hospital on this day. You will be unable to drive after your procedure so please make sure to bring someone with you to your procedure. You will need to have nothing to eat or drink after midnight the night prior to your procedure. You may have small sips of water, if needed, to take with your medication. You will need labs drawn prior to your procedure. Please go to [facility] to have this done no later than 07/16/2020. COVID test is required <6 days prior to upcoming procedure. Go to [facility] (near entrance at [facility], outside with tent) on 07/14/2020 between [address]. You should stop Xarelto 2 days prior to your scheduled procedure. After your procedure, you will need to follow up with Dr. Sarai Kaufman. Your follow-up appointment has been scheduled for 07/31/2020 at 10:45AM. Hibiclens 4% topical solution has been ordered and sent into your pharmacy You should start Hibiclens application 5 days prior to procedure date Directions Hibiclens 4%: Start cleanse 5 days prior to procedure 1. Rinse area (upper chest and upper arms) with water. 2. Apply minimum amount necessary to scrub the upper chest area from shoulder/neck to mid line of chest and to below the nipple each of  5 nights before the day of the procedure 3. Let solution dry. Future Appointments Date Time Provider Marielos Osorio 7/10/2020 10:20 AM Panda Ramirez  [address]  
7/31/2020 10:45 AM Natalya Kinney, [address]  
7/31/2020 11:00 AM Marycruz STEEN  
8/25/2020 10:40 AM Azalea Talley  [address]  
1/6/2021  9:30 AM Shasha Alarcon  [facility]

## 2020-07-13 ENCOUNTER — TELEPHONE (OUTPATIENT)
Dept: CARDIOLOGY CLINIC | Age: 75
End: 2020-07-13

## 2020-07-13 ENCOUNTER — HOSPITAL ENCOUNTER (OUTPATIENT)
Dept: LAB | Age: 75
Discharge: HOME | End: 2020-07-13
Payer: MEDICARE

## 2020-07-13 DIAGNOSIS — Z01.812 PRE-PROCEDURE LAB EXAM: Primary | ICD-10-CM

## 2020-07-13 PROCEDURE — 85025 COMPLETE CBC W/AUTO DIFF WBC: CPT

## 2020-07-13 PROCEDURE — 80048 BASIC METABOLIC PNL TOTAL CA: CPT

## 2020-07-13 PROCEDURE — 36415 COLL VENOUS BLD VENIPUNCTURE: CPT

## 2020-07-13 NOTE — TELEPHONE ENCOUNTER
Patient would like to speak with Joni Gonzales regarding pacemaker procedure on 7/20.     Phone; [phone]

## 2020-07-13 NOTE — TELEPHONE ENCOUNTER
Verified patient with two types of identifiers. Reviewed with patient again stress testing findings and reason for PPM recommendations. Also discussed current COVID situation and notified that the hospital is taking all precautions to keep everyone safe to the best of their abilities. Patient verbalized understanding and will call with any other questions.

## 2020-07-14 ENCOUNTER — HOSPITAL ENCOUNTER (OUTPATIENT)
Dept: PREADMISSION TESTING | Age: 75
Discharge: HOME | End: 2020-07-14
Payer: MEDICARE

## 2020-07-14 DIAGNOSIS — U07.1 COVID-19: ICD-10-CM

## 2020-07-14 DIAGNOSIS — Z01.812 PRE-PROCEDURE LAB EXAM: ICD-10-CM

## 2020-07-14 LAB
BASOPHILS # BLD AUTO: 0 X10E3/UL (ref 0–0.2)
BASOPHILS NFR BLD AUTO: 1 %
BUN SERPL-MCNC: 22 MG/DL (ref 8–27)
BUN/CREAT SERPL: 23 (ref 10–24)
CALCIUM SERPL-MCNC: 8.7 MG/DL (ref 8.6–10.2)
CHLORIDE SERPL-SCNC: 106 MMOL/L (ref 96–106)
CO2 SERPL-SCNC: 25 MMOL/L (ref 20–29)
CREAT SERPL-MCNC: 0.94 MG/DL (ref 0.76–1.27)
EOSINOPHIL # BLD AUTO: 0 X10E3/UL (ref 0–0.4)
EOSINOPHIL NFR BLD AUTO: 1 %
ERYTHROCYTE [DISTWIDTH] IN BLOOD BY AUTOMATED COUNT: 14.4 % (ref 11.6–15.4)
GLUCOSE SERPL-MCNC: 104 MG/DL (ref 65–99)
HCT VFR BLD AUTO: 35.2 % (ref 37.5–51)
HGB BLD-MCNC: 12 G/DL (ref 13–17.7)
IMM GRANULOCYTES # BLD AUTO: 0 X10E3/UL (ref 0–0.1)
IMM GRANULOCYTES NFR BLD AUTO: 0 %
LYMPHOCYTES # BLD AUTO: 1.2 X10E3/UL (ref 0.7–3.1)
LYMPHOCYTES NFR BLD AUTO: 41 %
MCH RBC QN AUTO: 31.3 PG (ref 26.6–33)
MCHC RBC AUTO-ENTMCNC: 34.1 G/DL (ref 31.5–35.7)
MCV RBC AUTO: 92 FL (ref 79–97)
MONOCYTES # BLD AUTO: 0.7 X10E3/UL (ref 0.1–0.9)
MONOCYTES NFR BLD AUTO: 22 %
MORPHOLOGY BLD-IMP: ABNORMAL
NEUTROPHILS # BLD AUTO: 1 X10E3/UL (ref 1.4–7)
NEUTROPHILS NFR BLD AUTO: 35 %
PLATELET # BLD AUTO: 134 X10E3/UL (ref 150–450)
POTASSIUM SERPL-SCNC: 4.5 MMOL/L (ref 3.5–5.2)
RBC # BLD AUTO: 3.83 X10E6/UL (ref 4.14–5.8)
SODIUM SERPL-SCNC: 143 MMOL/L (ref 134–144)
WBC # BLD AUTO: 2.9 X10E3/UL (ref 3.4–10.8)

## 2020-07-14 PROCEDURE — 87635 SARS-COV-2 COVID-19 AMP PRB: CPT

## 2020-07-15 LAB — SARS-COV-2, COV2NT: NOT DETECTED

## 2020-07-17 RX ORDER — SODIUM CHLORIDE 0.9 % (FLUSH) 0.9 %
5-40 SYRINGE (ML) INJECTION AS NEEDED
Status: CANCELLED | OUTPATIENT
Start: 2020-07-17

## 2020-07-17 RX ORDER — CEFAZOLIN SODIUM/WATER 2 G/20 ML
2 SYRINGE (ML) INTRAVENOUS ONCE
Status: CANCELLED | OUTPATIENT
Start: 2020-07-17 | End: 2020-07-17

## 2020-07-17 RX ORDER — SODIUM CHLORIDE 0.9 % (FLUSH) 0.9 %
5-40 SYRINGE (ML) INJECTION EVERY 8 HOURS
Status: CANCELLED | OUTPATIENT
Start: 2020-07-17

## 2020-07-20 ENCOUNTER — APPOINTMENT (OUTPATIENT)
Dept: GENERAL RADIOLOGY | Age: 75
End: 2020-07-20
Attending: NURSE PRACTITIONER
Payer: MEDICARE

## 2020-07-20 ENCOUNTER — HOSPITAL ENCOUNTER (OUTPATIENT)
Age: 75
Setting detail: OUTPATIENT SURGERY
Discharge: HOME | End: 2020-07-20
Attending: INTERNAL MEDICINE | Admitting: INTERNAL MEDICINE
Payer: MEDICARE

## 2020-07-20 VITALS
RESPIRATION RATE: 16 BRPM | HEIGHT: 69 IN | TEMPERATURE: 97.7 F | OXYGEN SATURATION: 97 % | DIASTOLIC BLOOD PRESSURE: 98 MMHG | HEART RATE: 62 BPM | BODY MASS INDEX: 24.88 KG/M2 | SYSTOLIC BLOOD PRESSURE: 173 MMHG | WEIGHT: 168 LBS

## 2020-07-20 DIAGNOSIS — R00.1 BRADYCARDIA: ICD-10-CM

## 2020-07-20 PROBLEM — Z95.0 PACEMAKER: Status: ACTIVE | Noted: 2020-07-20

## 2020-07-20 PROBLEM — I49.5 CHRONOTROPIC INCOMPETENCE WITH SINUS NODE DYSFUNCTION (HCC): Status: ACTIVE | Noted: 2020-07-20

## 2020-07-20 PROCEDURE — 77030040375: Performed by: INTERNAL MEDICINE

## 2020-07-20 PROCEDURE — 74011000250 HC RX REV CODE- 250: Performed by: INTERNAL MEDICINE

## 2020-07-20 PROCEDURE — 77030018547 HC SUT ETHBND1 J&J -B: Performed by: INTERNAL MEDICINE

## 2020-07-20 PROCEDURE — 77030022704 HC SUT VLOC COVD -B: Performed by: INTERNAL MEDICINE

## 2020-07-20 PROCEDURE — C1785 PMKR, DUAL, RATE-RESP: HCPCS | Performed by: INTERNAL MEDICINE

## 2020-07-20 PROCEDURE — C1893 INTRO/SHEATH, FIXED,NON-PEEL: HCPCS | Performed by: INTERNAL MEDICINE

## 2020-07-20 PROCEDURE — 77030010507 HC ADH SKN DERMBND J&J -B: Performed by: INTERNAL MEDICINE

## 2020-07-20 PROCEDURE — 99152 MOD SED SAME PHYS/QHP 5/>YRS: CPT | Performed by: INTERNAL MEDICINE

## 2020-07-20 PROCEDURE — 77030039046 HC PAD DEFIB RADIOTRNSPNT CNMD -B: Performed by: INTERNAL MEDICINE

## 2020-07-20 PROCEDURE — 77030019580 HC CBL PACE MEDT -B: Performed by: INTERNAL MEDICINE

## 2020-07-20 PROCEDURE — 74011250636 HC RX REV CODE- 250/636: Performed by: INTERNAL MEDICINE

## 2020-07-20 PROCEDURE — A4565 SLINGS: HCPCS | Performed by: INTERNAL MEDICINE

## 2020-07-20 PROCEDURE — 33208 INSRT HEART PM ATRIAL & VENT: CPT | Performed by: INTERNAL MEDICINE

## 2020-07-20 PROCEDURE — 99153 MOD SED SAME PHYS/QHP EA: CPT | Performed by: INTERNAL MEDICINE

## 2020-07-20 PROCEDURE — 71045 X-RAY EXAM CHEST 1 VIEW: CPT

## 2020-07-20 PROCEDURE — C1898 LEAD, PMKR, OTHER THAN TRANS: HCPCS | Performed by: INTERNAL MEDICINE

## 2020-07-20 DEVICE — LEAD 5076-58 MRI US RCMCRD
Type: IMPLANTABLE DEVICE | Status: FUNCTIONAL
Brand: CAPSUREFIX NOVUS MRI™ SURESCAN®

## 2020-07-20 DEVICE — IPG W1DR01 AZURE XT DR MRI WL USA BCP
Type: IMPLANTABLE DEVICE | Status: FUNCTIONAL
Brand: AZURE™ XT DR MRI SURESCAN™

## 2020-07-20 DEVICE — LEAD 5076-52 MRI US RCMCRD
Type: IMPLANTABLE DEVICE | Status: FUNCTIONAL
Brand: CAPSUREFIX NOVUS MRI™ SURESCAN®

## 2020-07-20 RX ORDER — SODIUM CHLORIDE 0.9 % (FLUSH) 0.9 %
5-40 SYRINGE (ML) INJECTION EVERY 8 HOURS
Status: DISCONTINUED | OUTPATIENT
Start: 2020-07-20 | End: 2020-07-20 | Stop reason: HOSPADM

## 2020-07-20 RX ORDER — SODIUM CHLORIDE 0.9 % (FLUSH) 0.9 %
5-40 SYRINGE (ML) INJECTION AS NEEDED
Status: DISCONTINUED | OUTPATIENT
Start: 2020-07-20 | End: 2020-07-20 | Stop reason: HOSPADM

## 2020-07-20 RX ORDER — LIDOCAINE HYDROCHLORIDE 10 MG/ML
INJECTION INFILTRATION; PERINEURAL AS NEEDED
Status: DISCONTINUED | OUTPATIENT
Start: 2020-07-20 | End: 2020-07-20 | Stop reason: HOSPADM

## 2020-07-20 RX ORDER — HYDROCODONE BITARTRATE AND ACETAMINOPHEN 5; 325 MG/1; MG/1
1 TABLET ORAL
Status: DISCONTINUED | OUTPATIENT
Start: 2020-07-20 | End: 2020-07-20 | Stop reason: HOSPADM

## 2020-07-20 RX ORDER — CEFAZOLIN SODIUM/WATER 2 G/20 ML
2 SYRINGE (ML) INTRAVENOUS ONCE
Status: COMPLETED | OUTPATIENT
Start: 2020-07-20 | End: 2020-07-20

## 2020-07-20 RX ORDER — CEPHALEXIN 250 MG/1
250 CAPSULE ORAL 3 TIMES DAILY
Qty: 15 CAP | Refills: 0 | Status: SHIPPED | OUTPATIENT
Start: 2020-07-20 | End: 2020-07-25

## 2020-07-20 RX ORDER — MIDAZOLAM HYDROCHLORIDE 1 MG/ML
INJECTION, SOLUTION INTRAMUSCULAR; INTRAVENOUS AS NEEDED
Status: DISCONTINUED | OUTPATIENT
Start: 2020-07-20 | End: 2020-07-20 | Stop reason: HOSPADM

## 2020-07-20 RX ORDER — ACETAMINOPHEN 325 MG/1
650 TABLET ORAL
Status: DISCONTINUED | OUTPATIENT
Start: 2020-07-20 | End: 2020-07-20 | Stop reason: HOSPADM

## 2020-07-20 RX ORDER — ONDANSETRON 2 MG/ML
4 INJECTION INTRAMUSCULAR; INTRAVENOUS
Status: DISCONTINUED | OUTPATIENT
Start: 2020-07-20 | End: 2020-07-20 | Stop reason: HOSPADM

## 2020-07-20 RX ORDER — VANCOMYCIN HYDROCHLORIDE 1 G/20ML
INJECTION, POWDER, LYOPHILIZED, FOR SOLUTION INTRAVENOUS AS NEEDED
Status: DISCONTINUED | OUTPATIENT
Start: 2020-07-20 | End: 2020-07-20 | Stop reason: HOSPADM

## 2020-07-20 RX ORDER — FENTANYL CITRATE 50 UG/ML
INJECTION, SOLUTION INTRAMUSCULAR; INTRAVENOUS AS NEEDED
Status: DISCONTINUED | OUTPATIENT
Start: 2020-07-20 | End: 2020-07-20 | Stop reason: HOSPADM

## 2020-07-20 NOTE — DISCHARGE INSTRUCTIONS
PATIENT INSTRUCTIONS POST-PACEMAKER IMPLANT  Resume xarelto tomorrow    1. No heavy lifting or exercises with the left arm for 4 weeks. This includes the following:  Do not raise arm above the shoulder level to comb hair, pull on clothes, etc... Do not use the affected arm to pull up or push up from a seated or laying  down position. Do not allow anyone else to pull on the affected arm. 2. Remove aquacel dressing in a week, or it can be removed in clinic at follow up visit if you prefer. Your incision will have skin glue covering the incision, the skin glue will help to reinforce the incision to prevent it from opening, please DO NOT attempt to peel the glue off of the incision. You do have sutures on the inside of the incision that are not visible. Please DO NOT try to scrub the skin glue off once you are able to take a shower. The skin glue will eventually fall off     3. Do not drive for 3 days. 4.  Call Dr. Mandeep Cedeno at [phone] if you experience any of the following symptoms:  1. Redness at the pacemaker site  2. Swelling at or around the pacemaker or in the left arm  3. Pain around the pacemaker  4. Dizziness, lightheadedness, fainting spells  5. Lack of energy  6. Shortness of breath  7. Rapid heart rate  8. Chest or muscle twitches    5. Follow-up with Dr. Ryan Estimable office as scheduled on 07/31/2020. Future Appointments   Date Time Provider Marielos Osorio   7/31/2020 10:45 AM PACEMAKER3, [address] [facility]   7/31/2020 11:00 AM WOUND CHECK, [address]   8/25/2020 10:40 AM Delmi Woody  [address]   1/6/2021  9:30 AM Maureen Irene  [facility]     6. You may use over the counter pain medication and ice pack for pain relief as needed. You may wear the sling as a reminder to keep your arm below the your shoulder. 7.  A prescription for antibiotics was sent electronically to your pharmacy on record.   Please pick it up & take as directed. Kelly Gan M.D.  [facility]  Electrophysiology/Cardiology  [facility]  [address] [facility] [address]  [phone]

## 2020-07-20 NOTE — PROCEDURES
Cardiac Procedure Note   Patient: Jaden Triana MRN: [id number]  SSN: [id number]   YOB: 1945 Age: 66 y.o.   Sex: male    Date of Procedure: 7/20/2020   Pre-procedure Diagnosis: chronotropic incompetence sick sinus syndrome  Post-procedure Diagnosis: same  Procedure: Permanent Pacemaker Insertion  :  Dr. Ruben Leigh MD    Assistant(s):  None  Anesthesia: Moderate Sedation   Estimated Blood Loss: Less than 10 mL   Specimens Removed: None  Findings: RAA lead  RV basal septal lead  Complications: None   Implants Med[employer]onic dual chamber pacemaker  Signed by:  Ruben Leigh MD  7/20/2020  1:44 PM

## 2020-07-20 NOTE — PROGRESS NOTES
TRANSFER - IN REPORT:    Verbal report received from [facility] on [employer].  being received from cath lab procedure area for routine progression of care. Report consisted of patients Situation, Background, Assessment and Recommendations(SBAR). Information from the following report(s) Kardex and Procedure Summary was reviewed with the receiving clinician. Opportunity for questions and clarification was provided. Assessment completed upon patients arrival to [facility] and care assumed. Cardiac Cath Lab Recovery Arrival Note:    [employer]. arrived to [facility]. Patient procedure= PPI. Patient on cardiac monitor, non-invasive blood pressure, SPO2 monitor. On RA. IV  of NS on pump at 25 ml/hr. Patient status doing well without problems. Patient is A&Ox 3. Patient reports no pain. PROCEDURE SITE CHECK:    Procedure site:without any bleeding and , No pain/discomfort reported at procedure site. No change in patient status. Continue to monitor patient and status.

## 2020-07-20 NOTE — PROGRESS NOTES
TRANSFER - OUT REPORT:    Verbal report given to Moncho Huffman RN on [facility]. being transferred to cath lab recovery for routine progression of care       Report consisted of patients Situation, Background, Assessment and   Recommendations(SBAR). Information from the following report(s) Procedure Summary was reviewed with the receiving nurse. Opportunity for questions and clarification was provided.

## 2020-07-20 NOTE — INTERVAL H&P NOTE
Update History & Physical    The Patient's History and Physical of July 20, 2020 was reviewed with the patient and I examined the patient. There was no change. The surgical site was confirmed by the patient and me. Plan:  The risk, benefits, expected outcome, and alternative to the recommended procedure have been discussed with the patient. Patient understands and wants to proceed with the procedure.     Electronically signed by Darcie Artis MD on 7/20/2020 at 10:21 AM

## 2020-07-20 NOTE — ROUTINE PROCESS
Cardiac Cath Lab Recovery Arrival Note:      [address] arrived to Cardiac Cath Lab, Recovery Area. Staff introduced to patient. Patient identifiers verified with NAME and DATE OF BIRTH. Procedure verified with patient. Consent forms reviewed and signed by patient or authorized representative and verified. Allergies verified. Patient and family oriented to department. Patient and family informed of procedure and plan of care. Questions answered with review. Patient prepped for procedure, per orders from physician, prior to arrival.    Patient on cardiac monitor, non-invasive blood pressure, SPO2 monitor. On RA. Patient is A&Ox 4. Patient reports no pain. Patient in stretcher, in low position, with side rails up, call bell within reach, patient instructed to call if assistance as needed. Patient prep in: [address], [place]. Patient family has pager #   Family in: . Prep by: JENNIFER Barajas RN

## 2020-07-20 NOTE — PROGRESS NOTES
Cardiac Cath Lab Procedure Area Arrival Note:    Constance Solano. arrived to Cardiac Cath Lab, Procedure Area. Patient identifiers verified with NAME and DATE OF BIRTH. Procedure verified with patient. Consent forms verified. Allergies verified. Patient informed of procedure and plan of care. Questions answered with review. Patient voiced understanding of procedure and plan of care. Patient on cardiac monitor, non-invasive blood pressure, SPO2 monitor. Placed on O2 @ 2 lpm via nasal cannula. IV of normal saline on pump at 25 ml/hr. Patient status doing well without problems. Patient is A&Ox 4. Patient reports no discomfort. Patient medicated during procedure with orders obtained and verified by Dr. Rocio Dhaliwal. Refer to patients Cardiac Cath Lab PROCEDURE REPORT for vital signs, assessment, status, and response during procedure, printed at end of case. Printed report on chart or scanned into chart.

## 2020-07-31 ENCOUNTER — CLINICAL SUPPORT (OUTPATIENT)
Dept: CARDIOLOGY CLINIC | Age: 75
End: 2020-07-31

## 2020-07-31 DIAGNOSIS — Z95.0 CARDIAC PACEMAKER IN SITU: Primary | ICD-10-CM

## 2020-07-31 DIAGNOSIS — Z95.0 PACEMAKER: ICD-10-CM

## 2020-07-31 NOTE — PROGRESS NOTES
Cardiac Electrophysiology Wound Check Note      Wound Check   Patient is here for wound check. No fever, drainage   Physical Exam   Constitutional: well-developed and well-nourished. Skin: Left side pocket is healing without redness, drainage, hematoma. The wound is intact with skin glue. ASSESSMENT and PLAN   The incision is healing without redness, drainage, hematoma. Intact with skin glue. The patient has finished their anti-biotic and been compliant with arm restrictions. They will continue arms restrictions for 2 more weeks.   current treatment plan is effective, no change in therapy   Device check shows proper lead and generator functions    Follow-up Disposition:  Return 3 months I will check via device clinic or remote monitoring in the future

## 2020-08-06 ENCOUNTER — DOCUMENTATION ONLY (OUTPATIENT)
Dept: CARDIOLOGY CLINIC | Age: 75
End: 2020-08-06

## 2020-08-06 NOTE — PROGRESS NOTES
Atrial fibrillation  bpm, 46% burden, persistent on new pacemaker alert    Continue with xarelto  Add toprol XL 50 mg every day  Follow up when clinic slot allows

## 2020-08-07 ENCOUNTER — TELEPHONE (OUTPATIENT)
Dept: CARDIOLOGY CLINIC | Age: 75
End: 2020-08-07

## 2020-08-07 RX ORDER — METOPROLOL SUCCINATE 25 MG/1
25 TABLET, EXTENDED RELEASE ORAL DAILY
Qty: 90 TAB | Refills: 3 | Status: SHIPPED | OUTPATIENT
Start: 2020-08-07 | End: 2020-11-03

## 2020-10-03 DIAGNOSIS — R00.1 SINUS BRADYCARDIA BY ELECTROCARDIOGRAM: ICD-10-CM

## 2020-10-03 DIAGNOSIS — I10 ESSENTIAL HYPERTENSION: ICD-10-CM

## 2020-10-03 DIAGNOSIS — I48.0 PAF (PAROXYSMAL ATRIAL FIBRILLATION) (HCC): ICD-10-CM

## 2020-10-03 DIAGNOSIS — R42 DIZZINESS: ICD-10-CM

## 2020-10-05 RX ORDER — RIVAROXABAN 20 MG/1
TABLET, FILM COATED ORAL
Qty: 90 TAB | Refills: 1 | Status: SHIPPED | OUTPATIENT
Start: 2020-10-05 | End: 2021-05-03

## 2020-10-05 NOTE — TELEPHONE ENCOUNTER
Received refill request for Xarelto 20 mg tabs. Refill authorized.     Future Appointments   Date Time Provider Marielos Osorio   11/3/2020 10:45 AM Hiram Thao BS AMB   11/3/2020 11:20 AM Gilford Coil, MD [facility] BS AMB   1/6/2021  9:30 AM Andrea Stearns  [facility]

## 2020-10-08 ENCOUNTER — TELEPHONE (OUTPATIENT)
Dept: CARDIOLOGY CLINIC | Age: 75
End: 2020-10-08

## 2020-10-08 NOTE — TELEPHONE ENCOUNTER
Patient stated that he has a few questions about his medications and would like a return call at your earliest convenience. Please advise.     Phone #: [phone]  Thanks

## 2020-10-08 NOTE — TELEPHONE ENCOUNTER
Called pt two patient identifiers confirmed. Pt saw urinologist this week for Urinary tract bleeding that has been going on for the last week. Urologist told Pt hold Xarelto for 2 days to see if the bleeding stops. Pt stated he is also scheduled to have cauterization in the urinary tract next week and would like to if he should hold Xarelto then as well and for how long.  Will ask MD/NP

## 2020-10-08 NOTE — TELEPHONE ENCOUNTER
OK to hold Xarelto x 2 days prior to cauterization procedure, resume as soon as cleared to do so by urology.

## 2020-10-08 NOTE — TELEPHONE ENCOUNTER
Called pt two patient identifiers confirmed. Notified pt about Eugene Murrieta NP recommendations. Pt verbalized understanding of information discussed w/ no further questions at this time.

## 2020-10-09 NOTE — TELEPHONE ENCOUNTER
Echo (02/08/2019): LVEF 55%, mild LVH, grade 1 diastolic dysfunction. LA mod dilated. Mild MR. Mild AR. Mild TR. Mild SC.     Mild short term memory deficit per PCP notes.     Stress test normal in June    Pacemaker in July for sick sinus syndrome, no adjustment needed for pacemaker    PAF    Ok to proceed with urologic procedure

## 2020-10-09 NOTE — TELEPHONE ENCOUNTER
Faxed cardiac clearance and instructions to hold Eliquis to [facility] at fax number [phone]. Fax confirmation received.

## 2020-10-09 NOTE — TELEPHONE ENCOUNTER
[facility] faxed needing clearance for upcoming Cystoscopy w/ clot evacuation & fulgeration under general anesthesia.  Will ask MD/NP for clearance

## 2020-11-03 ENCOUNTER — OFFICE VISIT (OUTPATIENT)
Dept: CARDIOLOGY CLINIC | Age: 75
End: 2020-11-03
Payer: MEDICARE

## 2020-11-03 ENCOUNTER — CLINICAL SUPPORT (OUTPATIENT)
Dept: CARDIOLOGY CLINIC | Age: 75
End: 2020-11-03
Payer: MEDICARE

## 2020-11-03 VITALS
HEIGHT: 69 IN | BODY MASS INDEX: 25.92 KG/M2 | SYSTOLIC BLOOD PRESSURE: 152 MMHG | WEIGHT: 175 LBS | HEART RATE: 61 BPM | DIASTOLIC BLOOD PRESSURE: 84 MMHG

## 2020-11-03 DIAGNOSIS — Z95.0 CARDIAC PACEMAKER IN SITU: Primary | ICD-10-CM

## 2020-11-03 DIAGNOSIS — R00.1 SINUS BRADYCARDIA BY ELECTROCARDIOGRAM: ICD-10-CM

## 2020-11-03 DIAGNOSIS — I10 ESSENTIAL HYPERTENSION: ICD-10-CM

## 2020-11-03 DIAGNOSIS — I48.0 PAROXYSMAL ATRIAL FIBRILLATION (HCC): ICD-10-CM

## 2020-11-03 DIAGNOSIS — I48.0 PAF (PAROXYSMAL ATRIAL FIBRILLATION) (HCC): ICD-10-CM

## 2020-11-03 PROCEDURE — 93280 PM DEVICE PROGR EVAL DUAL: CPT | Performed by: INTERNAL MEDICINE

## 2020-11-03 PROCEDURE — G8419 CALC BMI OUT NRM PARAM NOF/U: HCPCS | Performed by: INTERNAL MEDICINE

## 2020-11-03 PROCEDURE — G8536 NO DOC ELDER MAL SCRN: HCPCS | Performed by: INTERNAL MEDICINE

## 2020-11-03 PROCEDURE — G8432 DEP SCR NOT DOC, RNG: HCPCS | Performed by: INTERNAL MEDICINE

## 2020-11-03 PROCEDURE — G8753 SYS BP > OR = 140: HCPCS | Performed by: INTERNAL MEDICINE

## 2020-11-03 PROCEDURE — G0463 HOSPITAL OUTPT CLINIC VISIT: HCPCS | Performed by: INTERNAL MEDICINE

## 2020-11-03 PROCEDURE — 1101F PT FALLS ASSESS-DOCD LE1/YR: CPT | Performed by: INTERNAL MEDICINE

## 2020-11-03 PROCEDURE — G8427 DOCREV CUR MEDS BY ELIG CLIN: HCPCS | Performed by: INTERNAL MEDICINE

## 2020-11-03 PROCEDURE — G8754 DIAS BP LESS 90: HCPCS | Performed by: INTERNAL MEDICINE

## 2020-11-03 PROCEDURE — 99214 OFFICE O/P EST MOD 30 MIN: CPT | Performed by: INTERNAL MEDICINE

## 2020-11-03 RX ORDER — FINASTERIDE 5 MG/1
TABLET, FILM COATED ORAL
COMMUNITY
Start: 2020-10-07 | End: 2021-09-09

## 2020-11-03 RX ORDER — METOPROLOL SUCCINATE 50 MG/1
50 TABLET, EXTENDED RELEASE ORAL DAILY
Qty: 90 TAB | Refills: 3 | Status: SHIPPED | OUTPATIENT
Start: 2020-11-03 | End: 2021-12-20

## 2020-11-03 NOTE — PROGRESS NOTES
Cardiac Electrophysiology OFFICE Note     Subjective:      Maris Monk. is a 66 y.o. patient who is seen for follow up of dual-chamber Medtronic pacemaker for sick sinus syndrome. The patient also has paroxysmal atrial fibrillation and atrial flutter but he is not aware of them. .     He denies palpitations, chest pain, PND, orthopnea, lightheadedness, or syncope. Occasional trace lower extremity edema. BP elevated    Anticoagulated with Xarelto, denies bleeding issues. Previously followed by Dr. Audra Clement, and since Dr. Audra Clement is retired, Dr. Rosalio Ryan is going to be his primary care physician    The patient does not fall frequently but he tripped up the other day    Previous:  Echo (02/08/2019): LVEF 55%, mild LVH, grade 1 diastolic dysfunction. LA mod dilated. Mild MR. Mild AR. Mild TR. Mild NM. Mild short term memory deficit per PCP notes. Father had CVA.         Problem List  Date Reviewed: 11/3/2020          Codes Class Noted    Pacemaker ICD-10-CM: Z95.0  ICD-9-CM: V45.01  7/20/2020    Overview Signed 7/20/2020  1:44 PM by Maribeth Rosales MD     7/20/2020 Medtronic dual chamber              Chronotropic incompetence with sinus node dysfunction ([facility] ICD-10-CM: I49.5  ICD-9-CM: 427.81  7/20/2020        PAF (paroxysmal atrial fibrillation) ([facility] ICD-10-CM: I48.0  ICD-9-CM: 427.31  Unknown        Atrial fibrillation, chronic ([facility] ICD-10-CM: I48.20  ICD-9-CM: 427.31  Unknown        Pancytopenia ([facility] ICD-10-CM: U83.526  ICD-9-CM: 284.19  12/18/2018        Other neutropenia ([facility] ICD-10-CM: D70.8  ICD-9-CM: 288.09  12/15/2017    Overview Signed 12/15/2017  1:40 PM by Haley Watkins MD     Since 2013 with neg JESSY and normal B12             Bradycardia, sinus ICD-10-CM: R00.1  ICD-9-CM: 427.89  12/21/2015        Bilateral inguinal hernia ICD-10-CM: K40.20  ICD-9-CM: 550.92  12/26/2013        HTN (hypertension) ICD-10-CM: I10  ICD-9-CM: 401.9  9/24/2011        Panic disorder ICD-10-CM: F41.0  ICD-9-CM: 300.01  2011        Hyperlipidemia ICD-10-CM: E78.5  ICD-9-CM: 272.4  2011        BPH (benign prostatic hyperplasia) ICD-10-CM: N40.0  ICD-9-CM: 600.00  2011              Current Outpatient Medications   Medication Sig Dispense Refill    finasteride (PROSCAR) 5 mg tablet TK 1 T PO D      OTHER Take 2 Caps by mouth daily. Zyflamend      metoprolol succinate (TOPROL-XL) 50 mg XL tablet Take 1 Tab by mouth daily. 90 Tab 3    Xarelto 20 mg tab tablet TAKE 1 TABLET BY MOUTH DAILY WITH DINNER 90 Tab 1    tamsulosin (FLOMAX) 0.4 mg capsule Take 2 Caps by mouth daily.  180 Cap 11    sertraline (ZOLOFT) 100 mg tablet TAKE 1 TABLET BY MOUTH EVERY DAY 90 Tab 4     No Known Allergies  Past Medical History:   Diagnosis Date    Arthritis     Left knee Probable OA    Atrial fibrillation, chronic (HCC)     Bilateral inguinal hernia 2013    Bradycardia, sinus 2015    HTN (hypertension) 2011    Hyperlipidemia 2011    Hypertension     Inguinal hernia     Other ill-defined conditions(799.89)     high cholesterol    Other ill-defined conditions(799.89)     benign prostatic hypertrophy    Other neutropenia ([facility] 12/15/2017    Since  with neg JESSY and normal B12    PAF (paroxysmal atrial fibrillation) (HCC)     Pancytopenia ([facility] 2018    Panic disorder 2011     Past Surgical History:   Procedure Laterality Date    HX KNEE ARTHROSCOPY      HX UROLOGICAL      benign prostate bx    OR INS NEW/RPLCMT PRM PM W/TRANSV ELTRD ATRIAL&VENT  2020         OR INS NEW/RPLCMT PRM PM W/TRANSV ELTRD ATRIAL&VENT N/A 2020    INSERT PPM DUAL performed by Bridgette Smith MD at [address] [facility]     Family History   Problem Relation Age of Onset    Stroke Father          76 cva     Social History     Tobacco Use    Smoking status: Former Smoker    Smokeless tobacco: Never Used   Substance Use Topics    Alcohol use: Yes     Comment: 1/2 drink weekly Review of Systems:   Constitutional: Negative for fever, chills, weight loss, malaise/fatigue. HEENT: Negative for nosebleeds, vision changes. Respiratory: Negative for cough, hemoptysis  Cardiovascular: Negative for chest pain, palpitations, dizziness and no orthopnea, claudication, leg swelling, syncope, and PND. Gastrointestinal: Negative for nausea, vomiting, diarrhea, blood in stool and melena. Genitourinary: Negative for dysuria, and hematuria. Musculoskeletal: Negative for myalgias, + bilateral knee arthralgia. Skin: Negative for rash. Heme: Does not bleed or bruise easily. Neurological: Negative for speech change and focal weakness     Objective:     Visit Vitals  BP (!) 152/84   Pulse 61   Ht 5' 9\" (1.753 m)   Wt 175 lb (79.4 kg)   BMI 25.84 kg/m²      Physical Exam:   Constitutional: Well-developed and well-nourished. No respiratory distress. Head: Normocephalic and atraumatic. Eyes: Pupils are equal, round. Wearing glasses. ENT: Hearing grossly normal.  Neck: Supple. No JVD present. Cardiovascular normal rate, regular rhythm. Exam reveals no gallop and no friction rub. No murmur heard. Pulmonary/Chest: Effort normal and breath sounds normal. No wheezes. Abdominal: Soft, no tenderness. Musculoskeletal: No edema. Neurological: Alert,oriented. Skin: Skin is warm and dry. Psychiatric: Normal mood and affect. Behavior is normal. Judgment and thought content normal.         Assessment/Plan:       ICD-10-CM ICD-9-CM    1. Cardiac pacemaker in situ  Z95.0 V45.01    2. PAF (paroxysmal atrial fibrillation) ([facility])  I48.0 427.31    3. Paroxysmal atrial fibrillation ([facility])  I48.0 427.31    4. Essential hypertension  I10 401.9    5. Sinus bradycardia by electrocardiogram  R00.1 427.89      Mr. Lupillo Choe has not had known symptoms of paroxysmal atrial flutter and PAF.     His ventricular rate was 150 bpm when he had the episode of 27 hours of atrial flutter  He has had these in August and October  His pacemaker was implanted in July 2020  He is 81% right atrial pacing and less than 1% right ventricular pacing  His blood pressure is elevated when we try to reduce his Toprol XL  After discussion with him he has agreed to increase it back up to 50 mg once a day  Is occasional dizziness may be related to Flomax which he takes 2 capsules a day  Continue Xarelto for embolic CVA prophylaxis. Echo normal LVEF with trace MR, TR and mild AR  lexiscan cardiolite stress test 6/22/2020 sinus bradycardia PVC and normal perfusion   LVEF 50%      Future Appointments   Date Time Provider Marielos Osorio   1/6/2021  9:30 AM Melina Gerardo  [facility] [address]   2/17/2021  3:15 PM REMOTE1, LOIS MARIANO BS AMB   5/19/2021 11:00 AM REMOTE1, LOIS CH AMB   8/18/2021 10:00 AM REMOTE1, LOIS CH AMB   11/9/2021  9:40 AM PACEMAKER3, LOIS CH AMB   11/9/2021 10:00 AM MD GARCÍA Turner BS AMB       Thank you for involving me in this patient's care and please call with further concerns or questions. Clarita Dennis M.D.   Electrophysiology/Cardiology  [facility] [place]  [address] , [address]  [phone]

## 2020-12-15 ENCOUNTER — TELEPHONE (OUTPATIENT)
Dept: CARDIOLOGY CLINIC | Age: 75
End: 2020-12-15

## 2020-12-15 ENCOUNTER — DOCUMENTATION ONLY (OUTPATIENT)
Dept: CARDIOLOGY CLINIC | Age: 75
End: 2020-12-15

## 2020-12-15 NOTE — TELEPHONE ENCOUNTER
Faxed cardiac clearance to [facility] at fax number [phone]. Fax confirmation received.

## 2021-02-17 ENCOUNTER — OFFICE VISIT (OUTPATIENT)
Dept: CARDIOLOGY CLINIC | Age: 76
End: 2021-02-17
Payer: MEDICARE

## 2021-02-17 DIAGNOSIS — Z95.0 CARDIAC PACEMAKER IN SITU: Primary | ICD-10-CM

## 2021-02-17 PROCEDURE — 93294 REM INTERROG EVL PM/LDLS PM: CPT | Performed by: INTERNAL MEDICINE

## 2021-02-17 PROCEDURE — 93296 REM INTERROG EVL PM/IDS: CPT | Performed by: INTERNAL MEDICINE

## 2021-02-17 NOTE — LETTER
2/17/2021 10:15 AM 
 
Mr. Lavon Sanchez. [address] After careful review of your remote check of your implated device on [birth year] I have concluded that your device is working properly. Your next: 
 * Automatic remote check ( from home) is scheduled for  5-19-21 *Only Medtronic pacemakers  must  sent a manual transmission. If you have any questions, please call [facility] [address] at [phone]. Additional Comments: ________________________________________________ 
 
__________________________________________________________________ 
 
__________________________________________________________________ Sincerely, Bren Johnston MD [facility]

## 2021-02-19 ENCOUNTER — TELEPHONE (OUTPATIENT)
Dept: CARDIOLOGY CLINIC | Age: 76
End: 2021-02-19

## 2021-02-19 ENCOUNTER — DOCUMENTATION ONLY (OUTPATIENT)
Dept: CARDIOLOGY CLINIC | Age: 76
End: 2021-02-19

## 2021-02-19 NOTE — TELEPHONE ENCOUNTER
Patient's wife, Parke Goldberg, stated that the patient has been having blood in his urine and would like to know if he should stop his blood thinner. Please advise.     Phone #: [phone]  Thanks

## 2021-02-19 NOTE — TELEPHONE ENCOUNTER
Called pt two patient identifiers confirmed. Pt Wife stated on 1/21/21 pt had prostate removed and has restarted his Xarelto on 2/17/2021. Pt Wife stated that he has been urinating Blood on and off since restarting. Pt is seeing urologist tomorrow 2/20/21 @ 10:30 am but would like Dr. Mariano Trevino recommendation about continuing Xarelto. Notified pt that it is something he needs to be on long term due to a-fib burden on last device check.  Will ask MD/NP for recommendations

## 2021-02-19 NOTE — PROGRESS NOTES
82% atrial fib burden on pacer  Was asymptomatic   Future Appointments   Date Time Provider Marielos Osorio   5/19/2021 11:00 AM REMOTE1, LOIS CH AMB   8/18/2021 10:00 AM REMOTE1, LOIS CH AMB   11/9/2021  9:40 AM PACEMAKER3, LOIS CH AMB   11/9/2021 10:00 AM MD GARCÍA Dunn AMB

## 2021-02-19 NOTE — TELEPHONE ENCOUNTER
Recent device check showed AF burden >80%. [id number] (age, HTN), is high risk for CVA. If his urologist determines that it is safe for him to continue [address], that's great in terms of CVA prevention. We could try switching to Eliquis 5 mg po bid if he's deemed ok to continue [address], as that tends to have slightly lower bleeding risk. He should let us know what his urologist recommends, & we can make a plan from there. If urologist determines he must be off [address] long term, would consider referral to Dr. Catherine Leach to discuss possible Watchman to occlude COLIN.

## 2021-02-19 NOTE — TELEPHONE ENCOUNTER
Called pt two patient identifiers confirmed. Notified pt about NP recommendations. Pt verbalized understanding of information discussed w/ no further questions at this time.

## 2021-03-23 ENCOUNTER — TELEPHONE (OUTPATIENT)
Dept: CARDIOLOGY CLINIC | Age: 76
End: 2021-03-23

## 2021-03-23 DIAGNOSIS — R53.83 FATIGUE, UNSPECIFIED TYPE: ICD-10-CM

## 2021-03-23 DIAGNOSIS — R06.09 DYSPNEA ON EXERTION: Primary | ICD-10-CM

## 2021-03-23 NOTE — TELEPHONE ENCOUNTER
Patients wife is calling to scheduled a follow up from the hospital with Dr. Fabrizio Fair. Please advise.     Phone:  [phone]

## 2021-03-23 NOTE — TELEPHONE ENCOUNTER
Patients wife is calling as the patient was seen in the er last night and was advised to call Dr. Olson Seen office and manan scheduled for an echo cardiogram as soon as possible. Was unable to schedule at this time as there is no order. Please advise.     Phone: [phone]

## 2021-03-23 NOTE — TELEPHONE ENCOUNTER
Verified patient with two types of identifiers. Spoke with patient's wife, verified on HIPAA. Sunni Garvin states patient had increased SOB and fatigue for the past 2 weeks. Patient went to [facility] where they diuresed patient but no other medication changes. ER MD had recommended Echo. Discussed with Dr. Jonah Green who recommends scheduled Echo for LVEF. Scheduled patient for tomorrow at 11:00 am. Patient's wife verbalized understanding and will call with any other questions.       Future Appointments   Date Time Provider Marielos Osorio   3/24/2021 11:00 AM VASCULAR, LOIS MARIANO BS AMB   4/1/2021  2:00 PM Pranav Rodriguez MD NEUSM BS AMB   5/19/2021 11:00 AM REMOTE1, LOIS MARIANO BS AMB   8/18/2021 10:00 AM REMOTE1, LOIS MARIANO BS AMB   11/9/2021  9:40 AM PACEMAKER3, LOIS MARIANO BS AMB   11/9/2021 10:00 AM MD GARCÍA Bunn BS AMB

## 2021-03-23 NOTE — TELEPHONE ENCOUNTER
Returned patient wife's call. Verified patient by two identifiers. Patient wife called to discuss her husbands recent ER visit at [facility] [facility] last night for Dx: SOB and Fatigue by Dr. Gena Buchanan where an Echo was requested/ recommended. Patient and/or his wife can be reached at #[phone] or #[phone].

## 2021-03-24 ENCOUNTER — ANCILLARY PROCEDURE (OUTPATIENT)
Dept: CARDIOLOGY CLINIC | Age: 76
End: 2021-03-24
Payer: MEDICARE

## 2021-03-24 VITALS
BODY MASS INDEX: 25.18 KG/M2 | SYSTOLIC BLOOD PRESSURE: 139 MMHG | HEIGHT: 69 IN | DIASTOLIC BLOOD PRESSURE: 88 MMHG | WEIGHT: 170 LBS

## 2021-03-24 DIAGNOSIS — R06.09 DYSPNEA ON EXERTION: ICD-10-CM

## 2021-03-24 DIAGNOSIS — R53.83 FATIGUE, UNSPECIFIED TYPE: ICD-10-CM

## 2021-03-24 PROCEDURE — 93306 TTE W/DOPPLER COMPLETE: CPT | Performed by: INTERNAL MEDICINE

## 2021-03-25 ENCOUNTER — TELEPHONE (OUTPATIENT)
Dept: CARDIOLOGY CLINIC | Age: 76
End: 2021-03-25

## 2021-03-25 LAB
AV R PG: 91.03 MMHG
ECHO AO ASC DIAM: 3.67 CM
ECHO AO ROOT DIAM: 3.44 CM
ECHO AR MAX VEL PISA: 476.88 CM/S
ECHO AV AREA PEAK VELOCITY: 2.32 CM2
ECHO AV AREA VTI: 2.16 CM2
ECHO AV AREA/BSA PEAK VELOCITY: 1.2 CM2/M2
ECHO AV AREA/BSA VTI: 1.1 CM2/M2
ECHO AV MEAN GRADIENT: 2.86 MMHG
ECHO AV PEAK GRADIENT: 5.35 MMHG
ECHO AV PEAK VELOCITY: 115.69 CM/S
ECHO AV REGURGITANT PHT: 572.57 MS
ECHO AV VTI: 22.38 CM
ECHO IVC PROX: 2.4 CM
ECHO LA AREA 4C: 33.37 CM2
ECHO LA MAJOR AXIS: 5.43 CM
ECHO LA MINOR AXIS: 2.82 CM
ECHO LA VOL 2C: 127.73 ML (ref 18–58)
ECHO LA VOL 4C: 123.04 ML (ref 18–58)
ECHO LA VOL BP: 133.1 ML (ref 18–58)
ECHO LA VOL/BSA BIPLANE: 69.04 ML/M2 (ref 16–28)
ECHO LA VOLUME INDEX A2C: 66.26 ML/M2 (ref 16–28)
ECHO LA VOLUME INDEX A4C: 63.82 ML/M2 (ref 16–28)
ECHO LV E' LATERAL VELOCITY: 9.47 CM/S
ECHO LV E' SEPTAL VELOCITY: 3.87 CM/S
ECHO LV EDV A2C: 129.61 ML
ECHO LV EDV A4C: 111.65 ML
ECHO LV EDV BP: 123.23 ML (ref 67–155)
ECHO LV EDV INDEX A4C: 57.9 ML/M2
ECHO LV EDV INDEX BP: 63.9 ML/M2
ECHO LV EDV NDEX A2C: 67.2 ML/M2
ECHO LV EJECTION FRACTION 3D: 38.1 PERCENT
ECHO LV EJECTION FRACTION A2C: 40 PERCENT
ECHO LV EJECTION FRACTION A4C: 43 PERCENT
ECHO LV EJECTION FRACTION BIPLANE: 43 PERCENT (ref 55–100)
ECHO LV ESV A2C: 77.43 ML
ECHO LV ESV A4C: 63.5 ML
ECHO LV ESV BP: 70.26 ML (ref 22–58)
ECHO LV ESV INDEX A2C: 40.2 ML/M2
ECHO LV ESV INDEX A4C: 32.9 ML/M2
ECHO LV ESV INDEX BP: 36.4 ML/M2
ECHO LV GLOBAL LONGITUDINAL STRAIN (GLS): -10.2 PERCENT
ECHO LV INTERNAL DIMENSION DIASTOLIC: 5.12 CM (ref 4.2–5.9)
ECHO LV INTERNAL DIMENSION SYSTOLIC: 4.33 CM
ECHO LV IVSD: 1.22 CM (ref 0.6–1)
ECHO LV MASS 2D: 257 G (ref 88–224)
ECHO LV MASS INDEX 2D: 133.3 G/M2 (ref 49–115)
ECHO LV POSTERIOR WALL DIASTOLIC: 1.28 CM (ref 0.6–1)
ECHO LVOT DIAM: 1.92 CM
ECHO LVOT PEAK GRADIENT: 3.41 MMHG
ECHO LVOT PEAK VELOCITY: 92.35 CM/S
ECHO LVOT SV: 48.3 ML
ECHO LVOT VTI: 16.6 CM
ECHO PV REGURGITANT MAX VELOCITY: 174.34 CM/S
ECHO RA AREA 4C: 35.49 CM2
ECHO RV INTERNAL DIMENSION: 4.33 CM
ECHO RV TAPSE: 0.89 CM (ref 1.5–2)
ECHO TV REGURGITANT MAX VELOCITY: 339.73 CM/S
ECHO TV REGURGITANT PEAK GRADIENT: 46.17 MMHG
GLOBAL LONGITUDINAL STRAIN 2 CHAMBER: -10.5 PERCENT
GLOBAL LONGITUDINAL STRAIN 4 CHAMBER: -10.3 PERCENT
GLOBAL LONGITUDINAL STRAIN LONG AXIS: -9.8 PERCENT
LA VOL DISK BP: 125.21 ML (ref 18–58)

## 2021-03-25 NOTE — TELEPHONE ENCOUNTER
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

## 2021-03-25 NOTE — PROGRESS NOTES
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

## 2021-03-25 NOTE — TELEPHONE ENCOUNTER
Verified patient with two types of identifiers. Spoke with patient's wife, verified on HIPAA. Notified of results and MD recommendations. Scheduled patient for device check and follow up. Patient's wife verbalized understanding and will call with any other questions.       Future Appointments   Date Time Provider Marielos Jo   4/1/2021  2:00 PM Salome Rodriguez MD NEUSM BS AMB   4/1/2021  2:15 PM PACEMAKER3, LOIS MARIANO BS AMB   4/1/2021  2:30 PM MD GARCÍA Carroll BS AMB   5/19/2021 11:00 AM REMOTE1, LOIS CH AMB   8/18/2021 10:00 AM REMOTE1, LOIS CH AMB   11/9/2021  9:40 AM PACEMAKER3, LOIS MARIANO BS AMB   11/9/2021 10:00 AM MD GARCÍA Carroll BS AMB

## 2021-03-25 NOTE — TELEPHONE ENCOUNTER
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

## 2021-03-26 ENCOUNTER — DOCUMENTATION ONLY (OUTPATIENT)
Dept: CARDIOLOGY CLINIC | Age: 76
End: 2021-03-26

## 2021-03-26 NOTE — PROGRESS NOTES
Received records from [facility] dated 03/22/2021. Reported to ER with SOB x 2 weeks, generalized fatigue. SOB occurred after 2-3 steps or minimal exertion. /112  HR 79  SpO2 98  Resp 16    Trop <0.02.    COVID negative. Na 143  K 4.2  Cr 1.28  Tbili 0.5  AST 60  ALT 63    WBC 3.87  Hgb 10  Hct 33.6  Plt 147    Given Rocephin IV for possible pneumonia, furosemide po. Diagnosed with new onset CHF. No echo records sent. Small pleural effusion on CXR. ECG reportedly showed AF with controlled VR (81 bpm), but no ECG included in records. Patient has upcoming appt for follow up as shown below.     Future Appointments   Date Time Provider Marileos Osorio   4/1/2021  2:15 PM Alexis Leon BS AMB   4/1/2021  2:30 PM MD GARCÍA Loredo BS AMB   5/3/2021  2:00 PM Kenia Rodriguez MD NEUSM BS AMB   5/19/2021 11:00 AM REMOTE1, LOIS MARIANO BS AMB   8/18/2021 10:00 AM REMOTE1, LOIS MARIANO BS AMB   11/9/2021  9:40 AM PACEMAKER3, LOIS MARIANO BS AMB   11/9/2021 10:00 AM MD GARCÍA Loredo BS AMB

## 2021-03-29 ENCOUNTER — HOSPITAL ENCOUNTER (OUTPATIENT)
Dept: ULTRASOUND IMAGING | Age: 76
Discharge: HOME | End: 2021-03-29
Attending: INTERNAL MEDICINE
Payer: MEDICARE

## 2021-03-29 ENCOUNTER — TRANSCRIBE ORDER (OUTPATIENT)
Dept: SCHEDULING | Age: 76
End: 2021-03-29

## 2021-03-29 DIAGNOSIS — M25.562 PAIN AND SWELLING OF LEFT KNEE: ICD-10-CM

## 2021-03-29 DIAGNOSIS — M25.462 PAIN AND SWELLING OF LEFT KNEE: ICD-10-CM

## 2021-03-29 DIAGNOSIS — M25.562 LEFT KNEE PAIN: Primary | ICD-10-CM

## 2021-03-29 DIAGNOSIS — M25.462 SWELLING OF LEFT KNEE JOINT: ICD-10-CM

## 2021-03-29 PROCEDURE — 76882 US LMTD JT/FCL EVL NVASC XTR: CPT

## 2021-04-01 ENCOUNTER — OFFICE VISIT (OUTPATIENT)
Dept: CARDIOLOGY CLINIC | Age: 76
End: 2021-04-01
Payer: MEDICARE

## 2021-04-01 ENCOUNTER — CLINICAL SUPPORT (OUTPATIENT)
Dept: CARDIOLOGY CLINIC | Age: 76
End: 2021-04-01
Payer: MEDICARE

## 2021-04-01 VITALS
WEIGHT: 175 LBS | DIASTOLIC BLOOD PRESSURE: 98 MMHG | BODY MASS INDEX: 25.92 KG/M2 | SYSTOLIC BLOOD PRESSURE: 162 MMHG | HEART RATE: 72 BPM | HEIGHT: 69 IN

## 2021-04-01 DIAGNOSIS — R06.09 DOE (DYSPNEA ON EXERTION): ICD-10-CM

## 2021-04-01 DIAGNOSIS — I48.19 PERSISTENT ATRIAL FIBRILLATION (HCC): Primary | ICD-10-CM

## 2021-04-01 DIAGNOSIS — I10 ESSENTIAL HYPERTENSION: ICD-10-CM

## 2021-04-01 DIAGNOSIS — Z95.0 CARDIAC PACEMAKER IN SITU: ICD-10-CM

## 2021-04-01 DIAGNOSIS — Z79.01 ANTICOAGULATED: ICD-10-CM

## 2021-04-01 DIAGNOSIS — I50.22 SYSTOLIC CHF, CHRONIC (HCC): ICD-10-CM

## 2021-04-01 DIAGNOSIS — I42.8 NICM (NONISCHEMIC CARDIOMYOPATHY) (HCC): ICD-10-CM

## 2021-04-01 DIAGNOSIS — Z95.0 CARDIAC PACEMAKER IN SITU: Primary | ICD-10-CM

## 2021-04-01 PROCEDURE — G0463 HOSPITAL OUTPT CLINIC VISIT: HCPCS | Performed by: INTERNAL MEDICINE

## 2021-04-01 PROCEDURE — 99214 OFFICE O/P EST MOD 30 MIN: CPT | Performed by: INTERNAL MEDICINE

## 2021-04-01 PROCEDURE — G8432 DEP SCR NOT DOC, RNG: HCPCS | Performed by: INTERNAL MEDICINE

## 2021-04-01 PROCEDURE — G8755 DIAS BP > OR = 90: HCPCS | Performed by: INTERNAL MEDICINE

## 2021-04-01 PROCEDURE — G8753 SYS BP > OR = 140: HCPCS | Performed by: INTERNAL MEDICINE

## 2021-04-01 PROCEDURE — G8536 NO DOC ELDER MAL SCRN: HCPCS | Performed by: INTERNAL MEDICINE

## 2021-04-01 PROCEDURE — G8427 DOCREV CUR MEDS BY ELIG CLIN: HCPCS | Performed by: INTERNAL MEDICINE

## 2021-04-01 PROCEDURE — G8419 CALC BMI OUT NRM PARAM NOF/U: HCPCS | Performed by: INTERNAL MEDICINE

## 2021-04-01 PROCEDURE — 93288 INTERROG EVL PM/LDLS PM IP: CPT | Performed by: INTERNAL MEDICINE

## 2021-04-01 PROCEDURE — 1101F PT FALLS ASSESS-DOCD LE1/YR: CPT | Performed by: INTERNAL MEDICINE

## 2021-04-01 RX ORDER — AMIODARONE HYDROCHLORIDE 200 MG/1
TABLET ORAL
Qty: 104 TAB | Refills: 3 | Status: ON HOLD | OUTPATIENT
Start: 2021-04-01 | End: 2021-04-26 | Stop reason: SDUPTHER

## 2021-04-01 RX ORDER — LISINOPRIL 20 MG/1
20 TABLET ORAL DAILY
Qty: 90 TAB | Refills: 3 | Status: SHIPPED | OUTPATIENT
Start: 2021-04-01 | End: 2021-08-06 | Stop reason: SDUPTHER

## 2021-04-01 NOTE — PROGRESS NOTES
Cardiac Electrophysiology OFFICE Note     Subjective:      Kimberly Amaral is a 66 y.o. patient who presents for follow up of PAF, NICM, &  Medtronic dual chamber pacemaker (DOI 07/20/2020). NICM, LVEF 40% via echo in 03/2021. NYHA III chronic systolic CHF. On Toprol XL, but no ACEi/ARB. Device check today indicates persistent atrial flutter since 12/26/2020, some RVR. AF/AFL burden significantly increased compared to previous. Some RVR. He endorses lower extremity edema, SOB walking from room to room. He denies chest pain, PND, orthopnea, lightheadedness, or syncope. BP elevated today. Anticoagulated with Xarelto, had some problems with hematuria, but none in the past few weeks. He had prostatectomy in the past few months. Previous:  Mild short term memory deficit per PCP notes. Father had CVA.         Problem List  Date Reviewed: 11/3/2020          Codes Class Noted    Pacemaker ICD-10-CM: Z95.0  ICD-9-CM: V45.01  7/20/2020    Overview Signed 7/20/2020  1:44 PM by Wanda Singh MD     7/20/2020 Medtronic dual chamber              Chronotropic incompetence with sinus node dysfunction ([facility] ICD-10-CM: I49.5  ICD-9-CM: 427.81  7/20/2020        PAF (paroxysmal atrial fibrillation) ([facility] ICD-10-CM: I48.0  ICD-9-CM: 427.31  Unknown        Atrial fibrillation, chronic ([facility] ICD-10-CM: I48.20  ICD-9-CM: 427.31  Unknown        Pancytopenia ([facility] ICD-10-CM: Y92.399  ICD-9-CM: 284.19  12/18/2018        Other neutropenia ([facility] ICD-10-CM: D70.8  ICD-9-CM: 288.09  12/15/2017    Overview Signed 12/15/2017  1:40 PM by Flora Bales MD     Since 2013 with neg JESSY and normal B12             Bradycardia, sinus ICD-10-CM: R00.1  ICD-9-CM: 427.89  12/21/2015        Bilateral inguinal hernia ICD-10-CM: K40.20  ICD-9-CM: 550.92  12/26/2013        HTN (hypertension) ICD-10-CM: I10  ICD-9-CM: 401.9  9/24/2011        Panic disorder ICD-10-CM: F41.0  ICD-9-CM: 300.01  9/24/2011        Hyperlipidemia ICD-10-CM: E78.5  ICD-9-CM: 272.4  9/24/2011        BPH (benign prostatic hyperplasia) ICD-10-CM: N40.0  ICD-9-CM: 600.00  9/24/2011              Current Outpatient Medications   Medication Sig Dispense Refill    amiodarone (CORDARONE) 200 mg tablet 200 mg janeth a day for 2 week: Then 200 mg once daily 104 Tab 3    lisinopriL (PRINIVIL, ZESTRIL) 20 mg tablet Take 1 Tab by mouth daily. 90 Tab 3    furosemide (Lasix) 20 mg tablet Take 20 mg by mouth daily.  doxycycline (MONODOX) 100 mg capsule Take 100 mg by mouth two (2) times a day.  sertraline (ZOLOFT) 100 mg tablet TAKE 1 TABLET BY MOUTH DAILY 90 Tab 1    finasteride (PROSCAR) 5 mg tablet TK 1 T PO D      metoprolol succinate (TOPROL-XL) 50 mg XL tablet Take 1 Tab by mouth daily. 90 Tab 3    Xarelto 20 mg tab tablet TAKE 1 TABLET BY MOUTH DAILY WITH DINNER 90 Tab 1    tamsulosin (FLOMAX) 0.4 mg capsule Take 2 Caps by mouth daily.  180 Cap 11     No Known Allergies  Past Medical History:   Diagnosis Date    Arthritis     Left knee Probable OA    Atrial fibrillation, chronic (HCC)     Bilateral inguinal hernia 12/26/2013    Bradycardia, sinus 12/21/2015    Fracture of greater trochanter of left femur ([address] 12/2020    HTN (hypertension) 9/24/2011    Hyperlipidemia 9/24/2011    Hypertension     Inguinal hernia     Other ill-defined conditions(799.89)     high cholesterol    Other ill-defined conditions(799.89)     benign prostatic hypertrophy    Other neutropenia ([address] 12/15/2017    Since 2013 with neg JESSY and normal B12    PAF (paroxysmal atrial fibrillation) (HCC)     Pancytopenia ([address] 12/18/2018    Panic disorder 9/24/2011     Past Surgical History:   Procedure Laterality Date    HX KNEE ARTHROSCOPY      HX UROLOGICAL      benign prostate bx    CA INS NEW/RPLCMT PRM PM W/TRANSV ELTRD ATRIAL&VENT  7/20/2020         CA INS NEW/RPLCMT PRM PM W/TRANSV ELTRD ATRIAL&VENT N/A 7/20/2020    INSERT PPM DUAL performed by Beny Briggs MD at [facility] CARDIAC CATH LAB     Family History   Problem Relation Age of Onset    Stroke Father          76 cva     Social History     Tobacco Use    Smoking status: Former Smoker    Smokeless tobacco: Never Used   Substance Use Topics    Alcohol use: Not Currently        Review of Systems: All other review systems otherwise negative. Constitutional: Negative for fever, chills, weight loss, + malaise/fatigue. HEENT: Negative for nosebleeds, vision changes. Respiratory: Negative for cough, hemoptysis. Cardiovascular: Negative for chest pain, palpitations, dizziness and no orthopnea, claudication, + leg swelling, no syncope, and no PND. + BOLANOS  Gastrointestinal: Negative for nausea, vomiting, diarrhea, blood in stool and melena. Genitourinary: Negative for dysuria, and previous hematuria now resolved. Musculoskeletal: Negative for myalgias, + bilateral knee arthralgia. Skin: Negative for rash. Heme: Does not bleed or bruise easily. Neurological: Negative for speech change and focal weakness. Objective:     Visit Vitals  BP (!) 162/98   Pulse 72   Ht 5' 9\" (1.753 m)   Wt 175 lb (79.4 kg)   BMI 25.84 kg/m²      Physical Exam:   Constitutional: Well-developed and well-nourished. No respiratory distress. Head: Normocephalic and atraumatic. Eyes: Pupils are equal, round. Wearing glasses. ENT: Hearing grossly normal.  Neck: Supple. No JVD present. Cardiovascular: Normal rate, regular rhythm. Exam reveals no gallop and no friction rub. No murmur heard. Pulmonary/Chest: Effort normal and breath sounds normal. No wheezes. Abdominal: Soft, no tenderness. Musculoskeletal: Moves extremities independently. Normal gait. Vasc/lymphatic: Trace LLE edema, 1+ RLE edema. Neurological: Alert,oriented. Skin: Skin is warm and dry. Psychiatric: Normal mood and affect. Behavior is normal. Judgment and thought content normal.         Assessment/Plan:     Imaging/Studies:  Echo (2021): LVEF 40%. Mod dilated RV. Severe SUZE. Mild MR. Mild AR. Mild to mod TR. Mod PH. Trivial posterior pericardial effusion. Nuclear stress (06/22/2020): LVEF 50%. Stress echo (06/04/2020): Normal wall motion & LVEF at low workload. ICD-10-CM ICD-9-CM    1. Persistent atrial fibrillation (HCC)  I48.19 427.31 amiodarone (CORDARONE) 200 mg tablet      lisinopriL (PRINIVIL, ZESTRIL) 20 mg tablet   2. Cardiac pacemaker in situ  Z95.0 V45.01    3. Essential hypertension  I10 401.9    4. NICM (nonischemic cardiomyopathy) ([facility])  I42.8 425.4    5. Systolic CHF, chronic ([facility])  I50.22 428.22      428.0    6. Anticoagulated  Z79.01 V58.61         Medtronic dual chamber pacemaker (DOI 07/20/2020): Device check today shows proper lead & generator function. Generator longevity estimated 13 yrs, 2 mos. RA 25.88%, RV 14.13%. Currently in AF/AFL, episode in progress since 12/26/2020, some RVR. Since 02/17/2021, no new ventricular episodes of RVR. NICM: LVEF 40%, NYHA III chronic systolic CHF. Suspect cause of his symptom is persistent AF/AFL. On Toprol XL, will add lisinopril 20 mg po daily. Persistent AF/AFL: Ongoing since 12/26/2020, some RVR. Will start amiodarone loading at 200 mg po bid x 2 weeks, then reduce to 200 mg po daily thereafter with plan to do electrical cardioversion. LFTs WNL in 03/2021, TSH WNL in 06/2020. His chances of maintaining NSR thereafter are decreased, as he has severe biatrial enlargement (not noted in 2019). If he is unable to maintain despite AAD or is unable to tolerate amiodarone, would then consider AV node ablation & upgrade to biventricular pacemaker. I have explained these options to him and his wife  Given his dementia and atrial size, I do not recommend atrial ablation    HTN: BP elevated today. Adding lisinopril 20 mg po daily. Anticoagulation: Continue Xarelto as previously prescribed for embolic CVA prophylaxis.   Previous hematuria, but has resolved in the past couple weeks. EP clinic follow up post DCCV. Continue remote pacer checks q 3 months. Future Appointments   Date Time Provider Marielos Jo   5/3/2021  2:00 PM Lynn Rodriguez MD NEUSM BS AMB   5/17/2021  9:00 AM REMOTE1, LOIS MARIANO BS AMB   8/18/2021 10:00 AM REMOTE1, LOIS MARIANO BS AMB   11/9/2021  9:40 AM PACEMAKER3, LOIS CH AMB   11/9/2021 10:00 AM MD GARCÍA Gomez BS AMB       Thank you for involving me in this patient's care and please call with further concerns or questions. Lorene Hsu M.D.   Electrophysiology/Cardiology  [facility] [place]  [facility], [address]  [phone]

## 2021-04-01 NOTE — PATIENT INSTRUCTIONS
Your Cardioversion procedure has been scheduled for 4/26/2021 at 12:00 pm, at [facility]. 
 
Please report to Admitting Department by 10:00 am, or 2 hours prior to your scheduled procedure. Please bring a list of your current medications and medication bottles, if able, to the hospital on this day. You will be unable to drive after your procedure so please make sure to bring someone with you to your procedure. You will need to have nothing to eat or drink after midnight, the night prior to your procedure. You may have small sips of water, if needed, to take with your medication. You will need labs drawn prior to your procedure. Please go to the lab to have this done today. You should not stop your medication. After your procedure, you will need to follow up with Dr. Diandra Melendrez. Your follow-up appointment has been scheduled for 5/1/2021 at 11:30 am.  
 
Start taking Lisinopril 20 mg once a day. Start taking Amiodarone  200 mg two times a day for two weeks then 200 mg once a day

## 2021-04-02 RX ORDER — FUROSEMIDE 20 MG/1
20 TABLET ORAL DAILY
Qty: 90 TAB | Refills: 1 | Status: SHIPPED | OUTPATIENT
Start: 2021-04-02 | End: 2021-06-01

## 2021-04-02 NOTE — TELEPHONE ENCOUNTER
Request for Lasix 20 mg daily. Last office visit 4/1/21, next office visit 6/1/21. Refills per verbal order from Dr. Phil Rivera.

## 2021-04-13 ENCOUNTER — DOCUMENTATION ONLY (OUTPATIENT)
Dept: CARDIOLOGY CLINIC | Age: 76
End: 2021-04-13

## 2021-04-14 NOTE — PROGRESS NOTES
Alert for AT/AF burden >12 hrs x 10 days. Current AF episode noted since 04/08/2021, but burden 100% since last check on 04/01/2021. Previous episode began 12/26/2020, lasted 100 hrs (max duration per episode). Atrial rates range 300-545 bpm, mainly with controlled ventricular rate. Anticoagulated with Xarelto. Seen by Dr. Mehnaz Patel on 04/01/2021. Started amiodarone loading at 200 mg po bid x 2 weeks, then reduce to 200 mg po daily thereafter with plan to do electrical cardioversion. LFTs WNL in 03/2021, TSH WNL in 06/2020. His chances of maintaining NSR thereafter are decreased, as he has severe biatrial enlargement (not noted in 2019). If he is unable to maintain despite AAD or is unable to tolerate amiodarone, would then consider AV node ablation & upgrade to biventricular pacemaker.

## 2021-04-23 ENCOUNTER — TELEPHONE (OUTPATIENT)
Dept: CARDIOLOGY CLINIC | Age: 76
End: 2021-04-23

## 2021-04-23 RX ORDER — SODIUM CHLORIDE 0.9 % (FLUSH) 0.9 %
5-40 SYRINGE (ML) INJECTION AS NEEDED
Status: CANCELLED | OUTPATIENT
Start: 2021-04-23

## 2021-04-23 RX ORDER — SODIUM CHLORIDE 0.9 % (FLUSH) 0.9 %
5-40 SYRINGE (ML) INJECTION EVERY 8 HOURS
Status: CANCELLED | OUTPATIENT
Start: 2021-04-23

## 2021-04-23 NOTE — TELEPHONE ENCOUNTER
Patient's wife requesting to speak to you in regards to patient's upcoming surgery on Monday.      Phone: [phone]

## 2021-04-23 NOTE — TELEPHONE ENCOUNTER
Called pt two patient identifiers confirmed. Went over Pre-procedure instructions with Pt wife. Pt verbalized understanding of information discussed w/ no further questions at this time.

## 2021-04-26 ENCOUNTER — HOSPITAL ENCOUNTER (OUTPATIENT)
Age: 76
Setting detail: OUTPATIENT SURGERY
Discharge: HOME | End: 2021-04-26
Attending: INTERNAL MEDICINE | Admitting: INTERNAL MEDICINE
Payer: MEDICARE

## 2021-04-26 VITALS
OXYGEN SATURATION: 98 % | HEART RATE: 62 BPM | DIASTOLIC BLOOD PRESSURE: 115 MMHG | WEIGHT: 175 LBS | TEMPERATURE: 97.4 F | RESPIRATION RATE: 18 BRPM | BODY MASS INDEX: 25.92 KG/M2 | HEIGHT: 69 IN | SYSTOLIC BLOOD PRESSURE: 175 MMHG

## 2021-04-26 DIAGNOSIS — R53.83 FATIGUE, UNSPECIFIED TYPE: ICD-10-CM

## 2021-04-26 DIAGNOSIS — I48.91 ATRIAL FIBRILLATION, UNSPECIFIED TYPE (HCC): ICD-10-CM

## 2021-04-26 DIAGNOSIS — Z01.89 ENCOUNTER FOR CARDIOVERSION PROCEDURE: ICD-10-CM

## 2021-04-26 DIAGNOSIS — I48.19 PERSISTENT ATRIAL FIBRILLATION (HCC): ICD-10-CM

## 2021-04-26 LAB
ATRIAL RATE: 59 BPM
CALCULATED R AXIS, ECG10: -16 DEGREES
CALCULATED T AXIS, ECG11: 128 DEGREES
DIAGNOSIS, 93000: NORMAL
Q-T INTERVAL, ECG07: 478 MS
QRS DURATION, ECG06: 94 MS
QTC CALCULATION (BEZET), ECG08: 478 MS
VENTRICULAR RATE, ECG03: 60 BPM

## 2021-04-26 PROCEDURE — 92960 CARDIOVERSION ELECTRIC EXT: CPT | Performed by: INTERNAL MEDICINE

## 2021-04-26 PROCEDURE — 74011000250 HC RX REV CODE- 250: Performed by: INTERNAL MEDICINE

## 2021-04-26 PROCEDURE — 74011250636 HC RX REV CODE- 250/636: Performed by: INTERNAL MEDICINE

## 2021-04-26 PROCEDURE — 93005 ELECTROCARDIOGRAM TRACING: CPT

## 2021-04-26 PROCEDURE — 77030039046 HC PAD DEFIB RADIOTRNSPNT CNMD -B: Performed by: INTERNAL MEDICINE

## 2021-04-26 RX ORDER — AMIODARONE HYDROCHLORIDE 200 MG/1
200 TABLET ORAL DAILY
Qty: 104 TAB | Refills: 3 | Status: SHIPPED
Start: 2021-04-26 | End: 2021-09-09 | Stop reason: SINTOL

## 2021-04-26 RX ORDER — MIDAZOLAM HYDROCHLORIDE 1 MG/ML
INJECTION, SOLUTION INTRAMUSCULAR; INTRAVENOUS AS NEEDED
Status: DISCONTINUED | OUTPATIENT
Start: 2021-04-26 | End: 2021-04-26 | Stop reason: HOSPADM

## 2021-04-26 RX ORDER — HYDROCODONE BITARTRATE AND ACETAMINOPHEN 5; 325 MG/1; MG/1
1 TABLET ORAL
Status: CANCELLED | OUTPATIENT
Start: 2021-04-26

## 2021-04-26 RX ORDER — SODIUM CHLORIDE 0.9 % (FLUSH) 0.9 %
5-40 SYRINGE (ML) INJECTION AS NEEDED
Status: DISCONTINUED | OUTPATIENT
Start: 2021-04-26 | End: 2021-04-26 | Stop reason: HOSPADM

## 2021-04-26 RX ORDER — SODIUM CHLORIDE 0.9 % (FLUSH) 0.9 %
5-40 SYRINGE (ML) INJECTION EVERY 8 HOURS
Status: CANCELLED | OUTPATIENT
Start: 2021-04-26

## 2021-04-26 RX ORDER — SODIUM CHLORIDE 0.9 % (FLUSH) 0.9 %
5-40 SYRINGE (ML) INJECTION AS NEEDED
Status: CANCELLED | OUTPATIENT
Start: 2021-04-26

## 2021-04-26 RX ORDER — FENTANYL CITRATE 50 UG/ML
INJECTION, SOLUTION INTRAMUSCULAR; INTRAVENOUS AS NEEDED
Status: DISCONTINUED | OUTPATIENT
Start: 2021-04-26 | End: 2021-04-26 | Stop reason: HOSPADM

## 2021-04-26 RX ORDER — LABETALOL HYDROCHLORIDE 5 MG/ML
20 INJECTION, SOLUTION INTRAVENOUS ONCE
Status: COMPLETED | OUTPATIENT
Start: 2021-04-26 | End: 2021-04-26

## 2021-04-26 RX ORDER — SODIUM CHLORIDE 0.9 % (FLUSH) 0.9 %
5-40 SYRINGE (ML) INJECTION EVERY 8 HOURS
Status: DISCONTINUED | OUTPATIENT
Start: 2021-04-26 | End: 2021-04-26 | Stop reason: HOSPADM

## 2021-04-26 RX ORDER — ONDANSETRON 2 MG/ML
4 INJECTION INTRAMUSCULAR; INTRAVENOUS
Status: CANCELLED | OUTPATIENT
Start: 2021-04-26

## 2021-04-26 RX ORDER — ACETAMINOPHEN 325 MG/1
650 TABLET ORAL
Status: CANCELLED | OUTPATIENT
Start: 2021-04-26

## 2021-04-26 RX ADMIN — LABETALOL HYDROCHLORIDE 20 MG: 5 INJECTION INTRAVENOUS at 14:31

## 2021-04-26 NOTE — ROUTINE PROCESS
Cardiac Cath Lab Recovery Arrival Note:      2901 Radha Levy arrived to Cardiac Cath Lab, Recovery Area. Staff introduced to patient. Patient identifiers verified with NAME and DATE OF BIRTH. Procedure verified with patient. Consent forms reviewed and signed by patient or authorized representative and verified. Allergies verified. Patient informed of procedure and plan of care. Questions answered with review. Patient prepped for procedure, per orders from physician, prior to arrival.    Patient on cardiac monitor, non-invasive blood pressure, SPO2 monitor. Patient is A&Ox 4. Patient reports dull lower mid abdominal discomfort and SOB on exertion. Patient in stretcher, in low position, with side rails up, call bell within reach, patient instructed to call of assistance as needed. Patient prep in: [facility] Recovery Area, Bed# 9. Family in: waiting room. Prep by: DOMINIC Suarez

## 2021-04-26 NOTE — PROGRESS NOTES
Transfer to [address] [place] [address] from Procedure Area    Verbal report given to [facility] RN on [employer]. being transferred to Cardiac Cath Lab  for routine post - op   Patient is post Cardioversion procedure. Patient stable upon transfer to . Report consisted of patients Situation, Background, Assessment and   Recommendations(SBAR). Information from the following report(s) Procedure Summary, Intake/Output, MAR and Cardiac Rhythm Paced  was reviewed with the receiving nurse. Opportunity for questions and clarification was provided. Patient medicated during procedure with orders obtained and verified by Dr. Joselito Clinton. Refer to patient PROCEDURE REPORT for vital signs, assessment, status, and response during procedure.

## 2021-04-26 NOTE — PROGRESS NOTES
1520:  2901 Radha Levy ambulated @ 0276 (time) approximately 20 feet. Patient tolerated ambulation without adverse advents. 1531: I have reviewed discharge instructions with the patient and spouse. The patient and spouse verbalized understanding. 1542:  Patient wheeled out via wheelchair for discharge.

## 2021-04-26 NOTE — PROGRESS NOTES
Cardiac Cath Lab Procedure Area Arrival Note:    Ana M Marcano. arrived to Cardiac Cath Lab, Procedure Area. Patient identifiers verified with NAME and DATE OF BIRTH. Procedure verified with patient. Consent forms verified. Allergies verified. Patient informed of procedure and plan of care. Questions answered with review. Patient voiced understanding of procedure and plan of care. Patient on cardiac monitor, non-invasive blood pressure, SPO2 monitor. On room air. Placed on   O2 @ 3 lpm via nasal cannula. IV of NS on pump at  25 ml/hr. Patient status doing well without problems. Patient is A&Ox 4. Patient reports no pain. Patient medicated during procedure with orders obtained and verified by Dr. Lori Izquierdo. Refer to patients Cardiac Cath Lab PROCEDURE REPORT for vital signs, assessment, status, and response during procedure, printed at end of case. Printed report on chart or scanned into chart.

## 2021-04-26 NOTE — PROGRESS NOTES
TRANSFER - IN REPORT:    Verbal report received from [facility] on Arya Gamble , from the Cardiac Cath lab, for routine progression of care. Report consisted of patients Situation, Background, Assessment and Recommendations(SBAR). Information from the following report(s) Procedure Summary, Intake/Output, MAR and Recent Results was reviewed with the receiving clinician. Opportunity for questions and clarification was provided. Assessment completed upon patients arrival to [facility] and care assumed. Cardiac Cath Lab Recovery Arrival Note:     Arya Gamble arrived to [facility]. Patient procedure= CV. Patient on cardiac monitor, non-invasive blood pressure, Patient status doing well without problems. Patient is A&Ox 4. Patient reports no complaints. Procedure site without any bleeding and no hematoma. Dr. Pascale Mak made aware of the patient's elevated blood pressure. Orders received. See MAR.    1320:  Blood pressure is still elevated. Dr. Pascale Mak made aware. No orders received.

## 2021-04-26 NOTE — DISCHARGE INSTRUCTIONS
Discharge Instructions Post Cardioversion    1. No driving x 24 hours due to sedation medication that you received during your procedure. 2.  Continue amiodarone 200 mg by mouth daily & Xarelto 20 mg by mouth daily in addition to other medications as previously prescribed. 3.  Follow up with Dr. Lee Harrell as noted below. Future Appointments   Date Time Provider Marielos Osorio   5/3/2021  2:00 PM Enma Rodriguez MD NEUSM BS AMB   5/17/2021  9:00 AM REMOTE1, LOIS MARIANO BS AMB   6/1/2021 11:30 AM MD GARCÍA Patten BS AMB   8/18/2021 10:00 AM REMOTE1, LOIS MARIANO BS AMB   11/9/2021  9:40 AM PACEMAKER3, LOIS MARIANO BS AMB   11/9/2021 10:00 AM MD Sinan Patten M.D.   Electrophysiology/Cardiology  [facility] [place]  [address] [place] [address]                               [phone]

## 2021-04-26 NOTE — INTERVAL H&P NOTE
Update History & Physical    The Patient's History and Physical of April 1, 2021 was reviewed with the patient and I examined the patient. There was no change. The surgical site was confirmed by the patient and me. Plan:  The risk, benefits, expected outcome, and alternative to the recommended procedure have been discussed with the patient. Patient understands and wants to proceed with the procedure.     Electronically signed by Kevin Mesa MD on 4/26/2021 at 1:03 PM

## 2021-04-26 NOTE — PROCEDURES
Cardiac Procedure Note   Patient: Fermin Spine. MRN: [id number]  SSN: [id number]   YOB: 1945 Age: 64 y.o.   Sex: male    Date of Procedure: 4/26/2021   Pre-procedure Diagnosis: persistent Atrial fibrillation fatigue, pacemaker in place  Post-procedure Diagnosis: same  Procedure: Cardioversion  Interrogation of pacemaker  :  Dr. Apolonia Bolanos MD    Assistant(s):  None  Anesthesia: Moderate Sedation   Estimated Blood Loss none  Specimens Removed: None  Findings: he is on xarelto   J to atrial pacing  Recheck pacemaker lead function   Complications: None   Implants:  None  Signed by:  Apolonia Bolanos MD  4/26/2021  1:39 PM

## 2021-04-27 ENCOUNTER — DOCUMENTATION ONLY (OUTPATIENT)
Dept: CARDIOLOGY CLINIC | Age: 76
End: 2021-04-27

## 2021-04-27 NOTE — PROGRESS NOTES
His wife and I talked   I did not give him new medications   Amiodarone is once a day  toprol xl once a day  No antibiotic from me

## 2021-05-03 ENCOUNTER — OFFICE VISIT (OUTPATIENT)
Dept: NEUROLOGY | Age: 76
End: 2021-05-03
Payer: MEDICARE

## 2021-05-03 VITALS
DIASTOLIC BLOOD PRESSURE: 82 MMHG | HEIGHT: 69 IN | SYSTOLIC BLOOD PRESSURE: 140 MMHG | BODY MASS INDEX: 25.92 KG/M2 | RESPIRATION RATE: 16 BRPM | OXYGEN SATURATION: 99 % | WEIGHT: 175 LBS | HEART RATE: 82 BPM

## 2021-05-03 DIAGNOSIS — G20 PARKINSON'S DISEASE (HCC): Primary | ICD-10-CM

## 2021-05-03 DIAGNOSIS — I48.0 PAF (PAROXYSMAL ATRIAL FIBRILLATION) (HCC): ICD-10-CM

## 2021-05-03 DIAGNOSIS — R42 DIZZINESS: ICD-10-CM

## 2021-05-03 DIAGNOSIS — R00.1 SINUS BRADYCARDIA BY ELECTROCARDIOGRAM: ICD-10-CM

## 2021-05-03 DIAGNOSIS — I10 ESSENTIAL HYPERTENSION: ICD-10-CM

## 2021-05-03 PROCEDURE — G8427 DOCREV CUR MEDS BY ELIG CLIN: HCPCS | Performed by: PSYCHIATRY & NEUROLOGY

## 2021-05-03 PROCEDURE — G8510 SCR DEP NEG, NO PLAN REQD: HCPCS | Performed by: PSYCHIATRY & NEUROLOGY

## 2021-05-03 PROCEDURE — 1101F PT FALLS ASSESS-DOCD LE1/YR: CPT | Performed by: PSYCHIATRY & NEUROLOGY

## 2021-05-03 PROCEDURE — G8754 DIAS BP LESS 90: HCPCS | Performed by: PSYCHIATRY & NEUROLOGY

## 2021-05-03 PROCEDURE — G8753 SYS BP > OR = 140: HCPCS | Performed by: PSYCHIATRY & NEUROLOGY

## 2021-05-03 PROCEDURE — G8419 CALC BMI OUT NRM PARAM NOF/U: HCPCS | Performed by: PSYCHIATRY & NEUROLOGY

## 2021-05-03 PROCEDURE — G8536 NO DOC ELDER MAL SCRN: HCPCS | Performed by: PSYCHIATRY & NEUROLOGY

## 2021-05-03 PROCEDURE — 99204 OFFICE O/P NEW MOD 45 MIN: CPT | Performed by: PSYCHIATRY & NEUROLOGY

## 2021-05-03 RX ORDER — CARBIDOPA AND LEVODOPA 25; 100 MG/1; MG/1
TABLET ORAL
Qty: 78 TAB | Refills: 0 | Status: SHIPPED | OUTPATIENT
Start: 2021-05-03 | End: 2021-06-02

## 2021-05-03 RX ORDER — RIVAROXABAN 20 MG/1
TABLET, FILM COATED ORAL
Qty: 90 TAB | Refills: 1 | Status: SHIPPED | OUTPATIENT
Start: 2021-05-03 | End: 2021-10-14

## 2021-05-03 NOTE — PATIENT INSTRUCTIONS
[address] [facility]   Statement to Patients  April 1, 2014      In an effort to ensure the large volume of patient prescription refills is processed in the most efficient and expeditious manner, we are asking our patients to assist us by calling your Pharmacy for all prescription refills, this will include also your  Mail Order Pharmacy. The pharmacy will contact our office electronically to continue the refill process. Please do not wait until the last minute to call your pharmacy. We need at least 48 hours (2days) to fill prescriptions. We also encourage you to call your pharmacy before going to  your prescription to make sure it is ready. With regard to controlled substance prescription refill requests (narcotic refills) that need to be picked up at our office, we ask your cooperation by providing us with at least 72 hours (3days) notice that you will need a refill. We will not refill narcotic prescription refill requests after 4:00pm on any weekday, Monday through Thursday, or after 2:00pm on Fridays, or on the weekends. We encourage everyone to explore another way of getting your prescription refill request processed using [employer], our patient web portal through our electronic medical record system. [employer] is an efficient and effective way to communicate your medication request directly to the office and  downloadable as an yari on your smart phone . [employer] also features a review functionality that allows you to view your medication list as well as leave messages for your physician. Are you ready to get connected? If so please review the attatched instructions or speak to any of our staff to get you set up right away! Thank you so much for your cooperation. Should you have any questions please contact our Practice Administrator.     The Physicians and Staff,  [facility]

## 2021-05-03 NOTE — PROGRESS NOTES
Mr. Norman Serna presents as a new patient for evaluation of tremor of bilateral hands. The tremor is > left hand. Depression screening done on this patient.

## 2021-05-03 NOTE — TELEPHONE ENCOUNTER
Received refill request for Xarelto 20 mg po tabs. Last Cr WNL. Refill authorized.     Future Appointments   Date Time Provider Marielos Jo   5/17/2021  9:00 AM REMOTE1, LOIS MARIANO BS AMB   6/1/2021 11:30 AM MD GARCÍA Oropeza BS AMB   6/18/2021 11:40 AM Nikhil Rodriguez MD NEUSM BS AMB   8/18/2021 10:00 AM REMOTE1, LOIS MARIANO BS AMB   11/9/2021  9:40 AM PACEMAKER3, LOIS MARIANO BS AMB   11/9/2021 10:00 AM MD GARCÍA Oropeza BS AMB

## 2021-05-03 NOTE — PROGRESS NOTES
[facility]   NEW PATIENT EVALUATION/CONSULTATION       PATIENT NAME: Dwight Kendrick MRN: [id number]    REASON FOR CONSULTATION: Bilateral upper extremity tremor    05/03/21    HISTORY OF PRESENT ILLNESS:  Dwight Kendrick is a 64 y.o. right-hand-dominant gentleman multiple ankle problems who presents to [facility] neurology clinic as referral from his primary care provider for evaluation of bilateral upper extremity tremor. Patient reports history of tremor for several years without being able to further quantify. Tremors been slowly progressive and there is been some degree of intermittent falls as well. He and his wife do not provide much detail saying his brother also has a history of tremors though they are not sure what his diagnosis is. Does endorse some softening of his voice constipation and REM behavior disorder as well. Behavior disorder is reported to predate his tremor. States that he had a sleep study without significant findings and otherwise no evaluation for his tremor has been done over the years. Are here today for further recommendations. Denies any autonomic symptoms. No exposure to antiemetic such as metoclopramide or antipsychotics is reported.       PAST MEDICAL HISTORY:  Past Medical History:   Diagnosis Date    Arthritis     Left knee Probable OA    Atrial fibrillation, chronic (HCC)     Bilateral inguinal hernia 12/26/2013    Bradycardia, sinus 12/21/2015    Fracture of greater trochanter of left femur ([address] 12/2020    HTN (hypertension) 9/24/2011    Hyperlipidemia 9/24/2011    Hypertension     Inguinal hernia     Other ill-defined conditions(799.89)     high cholesterol    Other ill-defined conditions(799.89)     benign prostatic hypertrophy    Other neutropenia ([address] 12/15/2017    Since 2013 with neg JESSY and normal B12    PAF (paroxysmal atrial fibrillation) (HCC)     Pancytopenia ([address] 12/18/2018    Panic disorder 9/24/2011 PAST SURGICAL HISTORY:  Past Surgical History:   Procedure Laterality Date    HX KNEE ARTHROSCOPY      HX UROLOGICAL      benign prostate bx    CO CARDIOVERSION ELECTIVE ARRHYTHMIA EXTERNAL N/A 2021    EP CARDIOVERSION performed by Carli Cruz MD at [address], [facility]/s  CATH LAB    CO INS NEW/RPLCMT PRM PM W/TRANSV ELTRD ATRIAL&VENT  2020         CO INS NEW/RPLCMT PRM PM W/TRANSV ELTRD ATRIAL&VENT N/A 2020    INSERT PPM DUAL performed by Carli Cruz MD at [address], [facility]/s  CATH LAB       FAMILY HISTORY:   Family History   Problem Relation Age of Onset    Stroke Father          76 cva         SOCIAL HISTORY:  Social History     Socioeconomic History    Marital status:      Spouse name: Not on file    Number of children: Not on file    Years of education: Not on file    Highest education level: Not on file   Tobacco Use    Smoking status: Former Smoker    Smokeless tobacco: Never Used   Substance and Sexual Activity    Alcohol use: Not Currently         MEDICATIONS:   Current Outpatient Medications   Medication Sig Dispense Refill    amiodarone (CORDARONE) 200 mg tablet Take 1 Tab by mouth daily. 200 mg janeth a day for 2 week: Then 200 mg once daily 104 Tab 3    furosemide (Lasix) 20 mg tablet Take 1 Tab by mouth daily. 90 Tab 1    lisinopriL (PRINIVIL, ZESTRIL) 20 mg tablet Take 1 Tab by mouth daily. 90 Tab 3    sertraline (ZOLOFT) 100 mg tablet TAKE 1 TABLET BY MOUTH DAILY 90 Tab 1    finasteride (PROSCAR) 5 mg tablet TK 1 T PO D      metoprolol succinate (TOPROL-XL) 50 mg XL tablet Take 1 Tab by mouth daily. 90 Tab 3    Xarelto 20 mg tab tablet TAKE 1 TABLET BY MOUTH DAILY WITH DINNER 90 Tab 1    doxycycline (MONODOX) 100 mg capsule Take 100 mg by mouth two (2) times a day. ALLERGIES:  No Known Allergies      REVIEW OF SYSTEMS:  10 point ROS reviewed with patient. Please see scanned document under media.        PHYSICAL EXAM:  Vital Signs:   Visit Vitals  BP (!) 140/82   Pulse 82   Resp 16   Ht 5' 9\" (1.753 m)   Wt 175 lb (79.4 kg)   SpO2 99%   BMI 25.84 kg/m²     Physical examination:  Elderly male resting comfortably in exam room in no distress. HEENT appears grossly unremarkable with restricted range of facial expression noted. Neck appears supple. Cardiovascular demonstrates constant S1/S2 regular rhythm. Pulmonary demonstrate equal air entry bilaterally. Abdomen is nondistended/nontender. Extremities appear warm/dry. Neurologically, patient appears alert oriented to situation with orientation not further assessed. Attention is intact. Speech is soft. Language is fluent with patient reasonably following commands. Cranial through 12 are notable for impairment of saccadic eye movements and smooth pursuits. Otherwise abnormalities as already noted. Motorically patient has normal bulk and tone with exception at the wrist where cogwheel rigidity is noted bilaterally. 5 out of 5 strength is noted in upper and lower extremities. Sensation appears grossly intact to fine touch. Coordination is intact upper extremities. There is a high amplitude low-frequency rest tremor noted in the left hand greater than right. Primary gait and station appear intact there is slightly decreased arm swing on the left turns partially en bloc. Loses station with retropulsion testing antepulsion is unremarkable. Romberg absent.     PERTINENT DATA:  None    ASSESSMENT:      Rashaad Reyez is a 64year old right-hand-dominant male presents to [facility] neurology clinic to establish care for tremor with suspicion for idiopathic Parkinson's disease as the etiology    PLAN:  Parkinson's disease:  Appears rest tremor  predominant though with some postural instability as well  Discussed diagnosis at length with patient and wife including how his additional symptoms (REM behavior disorder, hypophonia, constipation, balance impairment and memory impairment) all make sense in confines of the suspected diagnosis  We will start patient on Sinemet and titrate gradually upward  Provide recommendation to try over-the-counter melatonin for REM behavior disorder  May need to consider physical therapy referral pending progress    Follow-up in 6 weeks    Ramya Villatoro MD       CC Referring provider:  Dorothey Osler, MD

## 2021-05-10 ENCOUNTER — TELEPHONE (OUTPATIENT)
Dept: CARDIOLOGY CLINIC | Age: 76
End: 2021-05-10

## 2021-05-10 NOTE — TELEPHONE ENCOUNTER
Patient's wife calling to speak to the nurse as the patient is going out of town on 5/11/21, just wanted to talk about the pacemakerand  medications     [phone]

## 2021-05-10 NOTE — TELEPHONE ENCOUNTER
Verified patient with two types of identifiers. Patient states they are going out of town for 2 weeks. Patient's wife wanted to verify that they can just unplug his box and then plug it back in when they get to their destination. Patient to continue all medications. Patient verbalized understanding and will call with any other questions.

## 2021-05-10 NOTE — TELEPHONE ENCOUNTER
Verified patient with two types of identifiers. Spoke with patient's wife, verified on HIPAA who wanted to verify how much Amiodarone patient is supposed to be taking. Notified wife he should be on Amiodarone 200 mg daily. Patient's wife verbalized understanding and will call with any other questions.

## 2021-05-10 NOTE — TELEPHONE ENCOUNTER
Patient's wife calling the nurse back to ask about medication that she's giving the patient, wants to make sure she's not giving him too much, please advise    [phone]

## 2021-05-17 ENCOUNTER — OFFICE VISIT (OUTPATIENT)
Dept: CARDIOLOGY CLINIC | Age: 76
End: 2021-05-17
Payer: MEDICARE

## 2021-05-17 ENCOUNTER — DOCUMENTATION ONLY (OUTPATIENT)
Dept: CARDIOLOGY CLINIC | Age: 76
End: 2021-05-17

## 2021-05-17 DIAGNOSIS — Z95.0 CARDIAC PACEMAKER IN SITU: Primary | ICD-10-CM

## 2021-05-17 PROCEDURE — 93296 REM INTERROG EVL PM/IDS: CPT | Performed by: INTERNAL MEDICINE

## 2021-05-17 PROCEDURE — 93294 REM INTERROG EVL PM/LDLS PM: CPT | Performed by: INTERNAL MEDICINE

## 2021-05-17 NOTE — LETTER
2021 8:56 AM 
 
Mr. Nitza Canchola. [address] Dear Patient, This letter is to inform you that as of  [facility] will no longer be sending out confirmation letters for remote transmissions through the [employer]. All letters in the future will be posted in an electronic medical records format therefore we highly encourage enrollment in [facility] patient's portal. Once enrolled you will have access to any letters we send as well as appointment information that can be found in your medical record. Our EP team would contact you via phone if there are significant abnormal findings so you can discuss with Dr. Nallely Edwards further in office. Missed transmission letters will also be digitized in [employer] but we will continue to send those out through the mail as well. How to register for [facility]: - Visit [employer] - Click Create an Account - Provide your name, address, and  
- You will then be asked a short series of questions to verify your identity. This helps to ensure that no one else can access your information.  
- If you have any further questions, please contact our office at [phone]. If you choose not to enroll in [facility] or do not have internet access, please kindly let device clinic know and we will accommodate your preference. We are grateful for your understanding and looking forward to this new, improved and more efficient way of communication. Warm Regards, [facility] Staff

## 2021-05-17 NOTE — LETTER
5/17/2021 8:53 AM 
 
Mr. Marian Ruiz [address] After careful review of your remote check of your implated device on [birth year]. I have concluded that your device is working properly. Your next: Automatic remote check ( from home) is scheduled for  8-18-21 If you have any questions, please call [address] at [phone]. Additional Comments: ________________________________________________ 
 
__________________________________________________________________ 
 
__________________________________________________________________ Gemini Elizabeth MD [facility]

## 2021-05-17 NOTE — PROGRESS NOTES
PAF flutter since CV    Future Appointments   Date Time Provider Marielos Jo   5/17/2021  9:00 AM REMOTE1, LOIS MARIANO BS AMB   6/1/2021 11:30 AM MD GARCÍA Cameron BS AMB   6/18/2021 11:40 AM AnnitMorro younger MD NEUSM BS AMB   8/18/2021 10:00 AM REMOTE1, LOIS MARIANO BS AMB   11/9/2021  9:40 AM PACEMAKER3, LOIS MARIANO BS AMB   11/9/2021 10:00 AM MD GARCÍA Cameron BS AMB

## 2021-06-01 ENCOUNTER — OFFICE VISIT (OUTPATIENT)
Dept: CARDIOLOGY CLINIC | Age: 76
End: 2021-06-01
Payer: MEDICARE

## 2021-06-01 VITALS
RESPIRATION RATE: 18 BRPM | WEIGHT: 162 LBS | BODY MASS INDEX: 23.99 KG/M2 | SYSTOLIC BLOOD PRESSURE: 140 MMHG | DIASTOLIC BLOOD PRESSURE: 82 MMHG | OXYGEN SATURATION: 99 % | HEIGHT: 69 IN | HEART RATE: 85 BPM

## 2021-06-01 DIAGNOSIS — I49.5 CHRONOTROPIC INCOMPETENCE WITH SINUS NODE DYSFUNCTION (HCC): ICD-10-CM

## 2021-06-01 DIAGNOSIS — Z95.0 CARDIAC PACEMAKER IN SITU: Primary | ICD-10-CM

## 2021-06-01 DIAGNOSIS — I10 ESSENTIAL HYPERTENSION: ICD-10-CM

## 2021-06-01 DIAGNOSIS — I42.8 NICM (NONISCHEMIC CARDIOMYOPATHY) (HCC): ICD-10-CM

## 2021-06-01 DIAGNOSIS — Z98.890 HISTORY OF CARDIOVERSION: ICD-10-CM

## 2021-06-01 DIAGNOSIS — I50.22 SYSTOLIC CHF, CHRONIC (HCC): ICD-10-CM

## 2021-06-01 DIAGNOSIS — I48.19 PERSISTENT ATRIAL FIBRILLATION (HCC): ICD-10-CM

## 2021-06-01 PROCEDURE — G0463 HOSPITAL OUTPT CLINIC VISIT: HCPCS | Performed by: INTERNAL MEDICINE

## 2021-06-01 PROCEDURE — G8536 NO DOC ELDER MAL SCRN: HCPCS | Performed by: INTERNAL MEDICINE

## 2021-06-01 PROCEDURE — G8753 SYS BP > OR = 140: HCPCS | Performed by: INTERNAL MEDICINE

## 2021-06-01 PROCEDURE — G8754 DIAS BP LESS 90: HCPCS | Performed by: INTERNAL MEDICINE

## 2021-06-01 PROCEDURE — 99214 OFFICE O/P EST MOD 30 MIN: CPT | Performed by: INTERNAL MEDICINE

## 2021-06-01 PROCEDURE — G8510 SCR DEP NEG, NO PLAN REQD: HCPCS | Performed by: INTERNAL MEDICINE

## 2021-06-01 PROCEDURE — 1101F PT FALLS ASSESS-DOCD LE1/YR: CPT | Performed by: INTERNAL MEDICINE

## 2021-06-01 PROCEDURE — G8420 CALC BMI NORM PARAMETERS: HCPCS | Performed by: INTERNAL MEDICINE

## 2021-06-01 PROCEDURE — G8427 DOCREV CUR MEDS BY ELIG CLIN: HCPCS | Performed by: INTERNAL MEDICINE

## 2021-06-01 NOTE — PROGRESS NOTES
Cardiac Electrophysiology OFFICE Note     Subjective:      Yissel Rodriguez is a 64 y.o. patient who presents for follow up of PAF, NICM, &  Medtronic dual chamber pacemaker (DOI 07/20/2020). NICM, LVEF 40% via echo in 03/2021. NYHA II chronic systolic CHF. On Toprol XL, but no ACEi/ARB. He denies chest pain, PND, orthopnea, lightheadedness, or syncope. Gait still unsteady and wife said he has leg prosthesis to use at home per PT because his knees are not straight up   Cardioverted AF to atrial pacing on April 26 2021  Anticoagulated with Xarelto, had some problems with hematuria, but none in the past few weeks. He had prostatectomy in the past few months. His wife said he lost 10 lbs and is concerned about lasix      Previous:  Mild short term memory deficit per PCP notes. Father had CVA.         Problem List  Date Reviewed: 6/1/2021        Codes Class Noted    Fatigue ICD-10-CM: R53.83  ICD-9-CM: 780.79  4/26/2021        Encounter for cardioversion procedure ICD-10-CM: Z01.89  ICD-9-CM: V72.85  4/26/2021    Overview Signed 4/26/2021  1:02 PM by Zach Deluca MD     4/26/2021             Pacemaker ICD-10-CM: Z95.0  ICD-9-CM: V45.01  7/20/2020    Overview Signed 7/20/2020  1:44 PM by Zach Deluca MD     7/20/2020 Medtronic dual chamber              Chronotropic incompetence with sinus node dysfunction ([facility] [address] ICD-10-CM: I49.5  ICD-9-CM: 427.81  7/20/2020        persistent atrial fibrillation  ICD-10-CM: I48.0  ICD-9-CM: 427.31  Unknown        Pancytopenia ([facility] [address] ICD-10-CM: V70.033  ICD-9-CM: 284.19  12/18/2018        Other neutropenia ([facility] [address] ICD-10-CM: D70.8  ICD-9-CM: 288.09  12/15/2017    Overview Signed 12/15/2017  1:40 PM by Jared Hickman MD     Since 2013 with neg JESSY and normal B12             Bradycardia, sinus ICD-10-CM: R00.1  ICD-9-CM: 427.89  12/21/2015        Bilateral inguinal hernia ICD-10-CM: K40.20  ICD-9-CM: 550.92  12/26/2013        HTN (hypertension) ICD-10-CM: I10  ICD-9-CM: 401.9  9/24/2011        Panic disorder ICD-10-CM: F41.0  ICD-9-CM: 300.01  9/24/2011        Hyperlipidemia ICD-10-CM: E78.5  ICD-9-CM: 272.4  9/24/2011        BPH (benign prostatic hyperplasia) ICD-10-CM: N40.0  ICD-9-CM: 600.00  9/24/2011              Current Outpatient Medications   Medication Sig Dispense Refill    carbidopa-levodopa (Sinemet)  mg per tablet Take 1 Tab by mouth daily for 5 days, THEN 1 Tab two (2) times a day for 5 days, THEN 1 Tab three (3) times daily for 21 days. 78 Tab 0    Xarelto 20 mg tab tablet TAKE 1 TABLET BY MOUTH DAILY WITH DINNER 90 Tab 1    amiodarone (CORDARONE) 200 mg tablet Take 1 Tab by mouth daily. 200 mg janeth a day for 2 week: Then 200 mg once daily 104 Tab 3    lisinopriL (PRINIVIL, ZESTRIL) 20 mg tablet Take 1 Tab by mouth daily. 90 Tab 3    sertraline (ZOLOFT) 100 mg tablet TAKE 1 TABLET BY MOUTH DAILY 90 Tab 1    finasteride (PROSCAR) 5 mg tablet TK 1 T PO D      metoprolol succinate (TOPROL-XL) 50 mg XL tablet Take 1 Tab by mouth daily. 90 Tab 3    doxycycline (MONODOX) 100 mg capsule Take 100 mg by mouth two (2) times a day.  (Patient not taking: Reported on 6/1/2021)       No Known Allergies  Past Medical History:   Diagnosis Date    Arthritis     Left knee Probable OA    Atrial fibrillation, chronic (HCC)     Bilateral inguinal hernia 12/26/2013    Bradycardia, sinus 12/21/2015    Fracture of greater trochanter of left femur ([address] 12/2020    HTN (hypertension) 9/24/2011    Hyperlipidemia 9/24/2011    Hypertension     Inguinal hernia     Other ill-defined conditions(799.89)     high cholesterol    Other ill-defined conditions(799.89)     benign prostatic hypertrophy    Other neutropenia ([address] 12/15/2017    Since 2013 with neg JESSY and normal B12    PAF (paroxysmal atrial fibrillation) ([address]     Pancytopenia ([address] 12/18/2018    Panic disorder 9/24/2011     Past Surgical History:   Procedure Laterality Date    HX KNEE ARTHROSCOPY  HX UROLOGICAL      benign prostate bx    MI CARDIOVERSION ELECTIVE ARRHYTHMIA EXTERNAL N/A 2021    EP CARDIOVERSION performed by Yareli Lind MD at [address], [facility]/s  CATH LAB    MI INS NEW/RPLCMT PRM PM W/TRANSV ELTRD ATRIAL&VENT  2020         MI INS NEW/RPLCMT PRM PM W/TRANSV ELTRD ATRIAL&VENT N/A 2020    INSERT PPM DUAL performed by Yareli Lind MD at [address], [facility]  CATH LAB     Family History   Problem Relation Age of Onset    Stroke Father          76 cva     Social History     Tobacco Use    Smoking status: Former Smoker    Smokeless tobacco: Never Used   Substance Use Topics    Alcohol use: Not Currently        Review of Systems: All other review systems otherwise negative. Constitutional: Negative for fever, chills, weight loss, malaise/fatigue. HEENT: Negative for nosebleeds, vision changes. Respiratory: Negative for cough, hemoptysis. Cardiovascular: Negative for chest pain, palpitations, dizziness and no orthopnea, claudication,  leg swelling, no syncope, and no PND. Gastrointestinal: Negative for nausea, vomiting, diarrhea, blood in stool and melena. Genitourinary: Negative for dysuria, and previous hematuria now resolved. Musculoskeletal: Negative for myalgias, + bilateral knee arthralgia. Skin: Negative for rash. Heme: Does not bleed or bruise easily. Neurological: Negative for speech change and focal weakness. Objective:     Visit Vitals  BP (!) 140/82 (BP 1 Location: Left upper arm, BP Patient Position: Sitting, BP Cuff Size: Adult)   Pulse 85   Resp 18   Ht 5' 9\" (1.753 m)   Wt 162 lb (73.5 kg)   SpO2 99%   BMI 23.92 kg/m²      Physical Exam:   Constitutional: Well-developed and well-nourished. No respiratory distress. Head: Normocephalic and atraumatic. Eyes: Pupils are equal, round. Wearing glasses. ENT: Hearing grossly normal.  Neck: Supple. No JVD present. Cardiovascular: Normal rate, regular rhythm.  Exam reveals no gallop and no friction rub. No murmur heard. Pulmonary/Chest: Effort normal and breath sounds normal. No wheezes. Abdominal: Soft, no tenderness. Musculoskeletal: Moves extremities independently   Vasc/lymphatic: no edema. Neurological: Alert,oriented. Skin: dry. Psychiatric: Normal mood and affect. Behavior is normal. Judgment and thought content normal.         Assessment/Plan:     Imaging/Studies:  Echo (03/24/2021): LVEF 40%. Mod dilated RV. Severe SUZE. Mild MR. Mild AR. Mild to mod TR. Mod PH. Trivial posterior pericardial effusion. Nuclear stress (06/22/2020): LVEF 50%. Stress echo (06/04/2020): Normal wall motion & LVEF at low workload. ICD-10-CM ICD-9-CM    1. Cardiac pacemaker in situ  Z95.0 V45.01    2. History of cardioversion  Y92.436 Z58.7 METABOLIC PANEL, COMPREHENSIVE      TSH 3RD GENERATION      ECHO ADULT COMPLETE   3. Essential hypertension  I10 401.9    4. Persistent atrial fibrillation (HCC)  I48.19 427.31    5. NICM (nonischemic cardiomyopathy) ([facility])  I42.8 425.4    6. Systolic CHF, chronic ([facility])  I50.22 428.22      428.0    7. Chronotropic incompetence with sinus node dysfunction ([facility])  I49.5 427.81         Medtronic dual chamber pacemaker (DOI 07/20/2020): Device check had shown proper lead & generator function. Generator longevity estimated 13 yrs  no new ventricular episodes of RVR since May 12, 2021. Continue with amiodarone for now     NICM: LVEF 40%  On Toprol XL, on lisinopril 20 mg po daily. Recheck echo in 3 months if able to maintain NSR    Persistent AF/AFL: cardioverted and On amiodarone   Will check labs CMP TSH    HTN: slightly elevated above 130 mmHg but can still acceptable  He is dry so no more lasix for now, he appears dehydrated  He had lost 10 lbs per his wife    Anticoagulation: Continue Xarelto as previously prescribed for embolic CVA prophylaxis.       Future Appointments   Date Time Provider Marielos Osorio   6/18/2021 11:40 AM Tre Rodriguez MD ROMEO BS AMB   8/18/2021 10:00 AM REMOTE1, LOIS MARIANO BS AMB   9/9/2021 10:00 AM ECHO, LOIS MARIANO BS AMB   9/9/2021 10:40 AM MD GARCÍA Barr BS AMB   11/9/2021  9:40 AM PACEMAKER3, LOIS MARIANO BS AMB   11/9/2021 10:00 AM MD GARCÍA Barr BS AMB       Thank you for involving me in this patient's care and please call with further concerns or questions. Margot Braden M.D.   Electrophysiology/Cardiology  [facility]  [facility] [address], [facility] [address] , [address]  [phone]

## 2021-06-01 NOTE — TELEPHONE ENCOUNTER
Requested Prescriptions     Pending Prescriptions Disp Refills    carbidopa-levodopa (Sinemet)  mg per tablet 78 Tablet 0     Sig: Take 1 Tablet by mouth daily for 5 days, THEN 1 Tablet two (2) times a day for 5 days, THEN 1 Tablet three (3) times daily for 21 days. Patient's wife states patient will be out of medication tomorrow. Pharmacy has been verified. Please call when sent in.

## 2021-06-01 NOTE — PATIENT INSTRUCTIONS
You will need to follow up in clinic with Dr. Javier Hubbard in 3  Months with echo same day. Please have lab work done in 3 months as well. Stop taking Lasix

## 2021-06-04 RX ORDER — CARBIDOPA AND LEVODOPA 25; 100 MG/1; MG/1
TABLET ORAL
Qty: 78 TABLET | Refills: 0 | OUTPATIENT
Start: 2021-06-04 | End: 2021-07-05

## 2021-06-18 ENCOUNTER — OFFICE VISIT (OUTPATIENT)
Dept: NEUROLOGY | Age: 76
End: 2021-06-18
Payer: MEDICARE

## 2021-06-18 VITALS
SYSTOLIC BLOOD PRESSURE: 138 MMHG | DIASTOLIC BLOOD PRESSURE: 80 MMHG | HEART RATE: 76 BPM | OXYGEN SATURATION: 98 % | RESPIRATION RATE: 18 BRPM

## 2021-06-18 DIAGNOSIS — G20 PARKINSON'S DISEASE (HCC): Primary | ICD-10-CM

## 2021-06-18 DIAGNOSIS — F02.80 DEMENTIA ASSOCIATED WITH OTHER UNDERLYING DISEASE WITHOUT BEHAVIORAL DISTURBANCE (HCC): ICD-10-CM

## 2021-06-18 DIAGNOSIS — F03.90 UNSPECIFIED DEMENTIA WITHOUT BEHAVIORAL DISTURBANCE: ICD-10-CM

## 2021-06-18 DIAGNOSIS — E53.8 B12 DEFICIENCY: ICD-10-CM

## 2021-06-18 DIAGNOSIS — R25.1 TREMOR, UNSPECIFIED: ICD-10-CM

## 2021-06-18 PROCEDURE — G8427 DOCREV CUR MEDS BY ELIG CLIN: HCPCS | Performed by: PSYCHIATRY & NEUROLOGY

## 2021-06-18 PROCEDURE — 1101F PT FALLS ASSESS-DOCD LE1/YR: CPT | Performed by: PSYCHIATRY & NEUROLOGY

## 2021-06-18 PROCEDURE — G8420 CALC BMI NORM PARAMETERS: HCPCS | Performed by: PSYCHIATRY & NEUROLOGY

## 2021-06-18 PROCEDURE — G8536 NO DOC ELDER MAL SCRN: HCPCS | Performed by: PSYCHIATRY & NEUROLOGY

## 2021-06-18 PROCEDURE — G8432 DEP SCR NOT DOC, RNG: HCPCS | Performed by: PSYCHIATRY & NEUROLOGY

## 2021-06-18 PROCEDURE — G8754 DIAS BP LESS 90: HCPCS | Performed by: PSYCHIATRY & NEUROLOGY

## 2021-06-18 PROCEDURE — G8752 SYS BP LESS 140: HCPCS | Performed by: PSYCHIATRY & NEUROLOGY

## 2021-06-18 PROCEDURE — 99214 OFFICE O/P EST MOD 30 MIN: CPT | Performed by: PSYCHIATRY & NEUROLOGY

## 2021-06-18 RX ORDER — MEMANTINE HYDROCHLORIDE 5 MG/1
5 TABLET ORAL DAILY
Qty: 30 TABLET | Refills: 0 | Status: SHIPPED | OUTPATIENT
Start: 2021-06-18 | End: 2021-07-18

## 2021-06-18 NOTE — PROGRESS NOTES
Neurology Clinic Follow up Note    Patient ID:  Lexie Short  [id number]  64 y.o.  1945      Mr. Kathia Jalloh is here for follow up today of  Chief Complaint   Patient presents with   Zora Olszewski Tremors          Last Appointment With Me:  5/3/2021       Interval History: In the interval from prior follow-up, patient has been doing somewhat better. Tremor is improved though persistent. Taking Sinemet 3 times daily with breakfast lunch and dinner. Does endorse ongoing difficulty with gait imbalance and occasional fall. Wife's main concern relates to memory largely in short-term fashion is concerned about driving. PMHx/ PSHx/ FHx/ SHx:  Reviewed and unchanged previous visit. ROS:  Comprehensive review of systems negative except for as noted above. Objective:       Meds:  Current Outpatient Medications   Medication Sig Dispense Refill    carbidopa-levodopa (SINEMET)  mg per tablet Take 1 Tablet by mouth three (3) times daily for 90 days. 270 Tablet 0    Xarelto 20 mg tab tablet TAKE 1 TABLET BY MOUTH DAILY WITH DINNER 90 Tab 1    amiodarone (CORDARONE) 200 mg tablet Take 1 Tab by mouth daily. 200 mg janeth a day for 2 week: Then 200 mg once daily 104 Tab 3    lisinopriL (PRINIVIL, ZESTRIL) 20 mg tablet Take 1 Tab by mouth daily. 90 Tab 3    sertraline (ZOLOFT) 100 mg tablet TAKE 1 TABLET BY MOUTH DAILY 90 Tab 1    finasteride (PROSCAR) 5 mg tablet TK 1 T PO D      metoprolol succinate (TOPROL-XL) 50 mg XL tablet Take 1 Tab by mouth daily. 90 Tab 3    doxycycline (MONODOX) 100 mg capsule Take 100 mg by mouth two (2) times a day. (Patient not taking: Reported on 6/18/2021)         Exam:  Visit Vitals  /80   Pulse 76   Resp 18   SpO2 98%     Pleasant male sitting countable in exam room in no distress. HEENT appears grossly unremarkable. Neck appears supple. Cardiopulmonary seems or not directly examined the patient is speaking full sentences without evidence of respiratory distress. Abdomen appears nondistended. Extremities appear thin/dry. Neurologically patient appears oriented to place, orientation not further assessed. Attention appears mildly impaired to casual conversation. Speech is hypophonic, language is fluent. Cranial to the 12 appear grossly intact other than restricted range of facial motion. Motorically patient moves extremities equally, has intermittent rest tremor of the left hand. Remainder of examination is deferred. LABS  Results for orders placed or performed during the hospital encounter of 04/26/21   EKG, 12 LEAD, INITIAL   Result Value Ref Range    Ventricular Rate 60 BPM    Atrial Rate 59 BPM    QRS Duration 94 ms    Q-T Interval 478 ms    QTC Calculation (Bezet) 478 ms    Calculated R Axis -16 degrees    Calculated T Axis 128 degrees    Diagnosis       Electronic atrial pacemaker  No previous ECGs available  Nonspecific ST segment changes  Confirmed by Lawrence Nye MD, John Kidd ([id number]) on 4/26/2021 4:59:51 PM           Assessment:     Evelyn Lofton is a 4year-old right-hand-dominant male with a history of suspected idiopathic Parkinson's disease who presents to [facility] neurology clinic for follow-up    Plan:   Idiopathic Parkinson's disease:  Patient endorses some improvement of symptoms with Sinemet, encouraged him to take it every 4 hours to better determine needs, fluctuations etc.  Also mention they could try taking it before meals to determine whether competitive protein absorption is impairing medication concentration  Otherwise [facility] as well as driving evaluation  Suspect that memory impairment is occurring in the context of a Parkinson's dementia complex, had plan to attempt a trial of Aricept though admitted this is not an indication typically for the medication.   Given the patient is on amiodarone with potential for QTC prolongation will place on low-dose memantine and gauge for any benefit  Also check TSH, B12    Follow-up in 3 months    Greater than 30 minutes was spent present by me during this visit reviewing data, interviewing patient and formulating plan of care as well as and documentation in a 24-hour period.        Signed:  Jessee Kidd MD  6/18/2021  12:17 PM

## 2021-06-19 LAB
TSH SERPL DL<=0.005 MIU/L-ACNC: 12.8 UIU/ML (ref 0.45–4.5)
VIT B12 SERPL-MCNC: 686 PG/ML (ref 232–1245)

## 2021-07-21 ENCOUNTER — TELEPHONE (OUTPATIENT)
Dept: NEUROLOGY | Age: 76
End: 2021-07-21

## 2021-07-21 ENCOUNTER — DOCUMENTATION ONLY (OUTPATIENT)
Dept: NEUROLOGY | Age: 76
End: 2021-07-21

## 2021-07-21 RX ORDER — MEMANTINE HYDROCHLORIDE 5 MG/1
5 TABLET ORAL 2 TIMES DAILY
Qty: 60 TABLET | Refills: 5 | Status: SHIPPED | OUTPATIENT
Start: 2021-07-21 | End: 2021-10-08

## 2021-07-21 NOTE — PROGRESS NOTES
I left a message for patient to schedule an appt for f/u in September or October, per Dr. Haines Members.

## 2021-08-06 ENCOUNTER — TELEPHONE (OUTPATIENT)
Dept: CARDIOLOGY CLINIC | Age: 76
End: 2021-08-06

## 2021-08-06 DIAGNOSIS — I48.19 PERSISTENT ATRIAL FIBRILLATION (HCC): ICD-10-CM

## 2021-08-06 DIAGNOSIS — I10 ESSENTIAL HYPERTENSION: Primary | ICD-10-CM

## 2021-08-06 RX ORDER — LISINOPRIL 40 MG/1
40 TABLET ORAL DAILY
Qty: 90 TABLET | Refills: 1 | Status: SHIPPED | OUTPATIENT
Start: 2021-08-06 | End: 2022-02-18

## 2021-08-06 NOTE — TELEPHONE ENCOUNTER
Verified patient with two types of identifiers. Spoke with patient's wife, verified on PHI. Notified patient of NP recommendations. Patient's wife verbalized understanding and will call with any other questions.

## 2021-08-06 NOTE — TELEPHONE ENCOUNTER
Verified patient with two types of identifiers. Spoke with patient's wife verified on PHI who verified information below. Reviewed current medications that patient is taking Amiodarone 200 mg daily, Lisinopril 20 mg daily, and Metoprolol 50 mg daily. Patient rechecked BP while on the phone, 165/107. Patient states he feels fine and is asymptomatic.  Will notify NP for recommendations

## 2021-08-06 NOTE — TELEPHONE ENCOUNTER
Patient's calling to speak to the nurse, patient BP on 8/06/21 was 146/93, patient's wife stated that the patient had PT on 8/04/21 and BP was 177/108 and before leaving it was 187/105, she stated PT could not even work with the patient, patient is not on a BP medication, only on a blood thinner, please advise        [phone]

## 2021-08-07 ENCOUNTER — DOCUMENTATION ONLY (OUTPATIENT)
Dept: CARDIOLOGY CLINIC | Age: 76
End: 2021-08-07

## 2021-08-07 NOTE — PROGRESS NOTES
bp high per wife  He picks up meds at the store with son  Lisinopril 40 mg every day  He is stressed over $$$ and financial issue she said  I recommend additional lisinopril 20 mg every day since he still has at home for next 2 days but if bp is down to normal then 40 mg every day only  If bp is not lower then he has to go to ER      Otherwise I would add norvasc 5 mg every day next week if bp is mildly higher than goal 130/80  Current Outpatient Medications on File Prior to Visit   Medication Sig Dispense Refill    lisinopriL (PRINIVIL, ZESTRIL) 40 mg tablet Take 1 Tablet by mouth daily. 90 Tablet 1    memantine (NAMENDA) 5 mg tablet Take 1 Tablet by mouth two (2) times a day. 60 Tablet 5    carbidopa-levodopa (SINEMET)  mg per tablet Take 1 Tablet by mouth three (3) times daily for 90 days. 270 Tablet 0    Xarelto 20 mg tab tablet TAKE 1 TABLET BY MOUTH DAILY WITH DINNER 90 Tab 1    amiodarone (CORDARONE) 200 mg tablet Take 1 Tab by mouth daily. 200 mg janeth a day for 2 week: Then 200 mg once daily 104 Tab 3    doxycycline (MONODOX) 100 mg capsule Take 100 mg by mouth two (2) times a day. (Patient not taking: Reported on 6/18/2021)      sertraline (ZOLOFT) 100 mg tablet TAKE 1 TABLET BY MOUTH DAILY 90 Tab 1    finasteride (PROSCAR) 5 mg tablet TK 1 T PO D      metoprolol succinate (TOPROL-XL) 50 mg XL tablet Take 1 Tab by mouth daily. 90 Tab 3     No current facility-administered medications on file prior to visit.      Future Appointments   Date Time Provider Marielos Osorio   8/18/2021 10:00 AM REMOTE1LOIS BS AMB   9/9/2021 10:00 AM LOIS DE BS AMB   9/9/2021 10:40 AM MD GARCÍA Desai BS AMB   10/4/2021  1:40 PM Meron Rodriguez MD NEUSM BS AMB   11/9/2021  9:40 AM [email or web address] BS AMB   11/9/2021 10:00 AM MD GARCÍA Desai BS AMB

## 2021-08-09 ENCOUNTER — TELEPHONE (OUTPATIENT)
Dept: CARDIOLOGY CLINIC | Age: 76
End: 2021-08-09

## 2021-08-09 RX ORDER — AMLODIPINE BESYLATE 5 MG/1
5 TABLET ORAL DAILY
Qty: 30 TABLET | Refills: 5 | Status: SHIPPED | OUTPATIENT
Start: 2021-08-09 | End: 2021-11-09 | Stop reason: ALTCHOICE

## 2021-08-09 NOTE — TELEPHONE ENCOUNTER
Verified patient with two types of identifiers. Patient walked in for blood pressure check. Patient's wife was across the flores and just wanted to have us check his BP. BP in office 168/92. Patient remains asymptomatic. VO per Dr. Sinan Greene patient to go back to taking Lisinopril 40 mg daily and start Amlodipine 5 mg daily. Verified pharmacy. Patient to continue to monitor BP. Patient verbalized understanding and will call with any other questions.       Future Appointments   Date Time Provider Marielos Osorio   8/18/2021 10:00 AM REMOTE1LOIS BS AMB   9/9/2021 10:00 AM LOIS DE BS AMB   9/9/2021 10:40 AM MD GARCÍA Dominguez BS AMB   10/4/2021  1:40 PM Hekmatdoost, Cole Opitz, MD NEUSM BS AMB   11/9/2021  9:40 AM [email or web address] BS AMB   11/9/2021 10:00 AM MD GARCÍA Dominguez BS AMB

## 2021-08-09 NOTE — TELEPHONE ENCOUNTER
Pt wife called two patient identifiers confirmed. Mrs. Haydee Villarreal stated BP this morning was 186/124 without taking medications. Mrs. Haydee Villarreal stated the pt just took lisinopril 60 mg at 9:00 am this morning. Notified Mrs. Mandujano to take BP in an Hour and I will call her back to get readings.

## 2021-08-09 NOTE — TELEPHONE ENCOUNTER
Called pt Wife Mrs. Jennifer Hannon two patient identifiers confirmed. Mrs. Jennifer Hannon stated Blood pressure has dropped to 166/97. Notified Mrs. Jennifer Hannon I will ask NP if pt should go to the ED or possibly start another medication.

## 2021-08-17 RX ORDER — METOPROLOL SUCCINATE 25 MG/1
TABLET, EXTENDED RELEASE ORAL DAILY
OUTPATIENT
Start: 2021-08-17

## 2021-08-18 ENCOUNTER — OFFICE VISIT (OUTPATIENT)
Dept: CARDIOLOGY CLINIC | Age: 76
End: 2021-08-18
Payer: MEDICARE

## 2021-08-18 DIAGNOSIS — Z95.0 CARDIAC PACEMAKER IN SITU: Primary | ICD-10-CM

## 2021-08-18 PROCEDURE — 93296 REM INTERROG EVL PM/IDS: CPT | Performed by: INTERNAL MEDICINE

## 2021-08-18 NOTE — LETTER
8/18/2021 10:05 AM    Mr. Lupillo George. [address]          This letter confirms that we have received your scheduled remote check of your implanted     device on 8-18-21  . Our EP team will contact you via phone if there are significant abnormal    findings. Your next in-clinic device check is scheduled for 11-9-21 at 9:40am  .                   If you have any questions, please call [address] [place] [address] Drive at [phone].                Sincerely,    Glennis Epley, MD [facility]

## 2021-09-09 ENCOUNTER — OFFICE VISIT (OUTPATIENT)
Dept: CARDIOLOGY CLINIC | Age: 76
End: 2021-09-09
Payer: MEDICARE

## 2021-09-09 ENCOUNTER — ANCILLARY PROCEDURE (OUTPATIENT)
Dept: CARDIOLOGY CLINIC | Age: 76
End: 2021-09-09
Payer: MEDICARE

## 2021-09-09 ENCOUNTER — TELEPHONE (OUTPATIENT)
Dept: CARDIOLOGY CLINIC | Age: 76
End: 2021-09-09

## 2021-09-09 VITALS
SYSTOLIC BLOOD PRESSURE: 148 MMHG | WEIGHT: 163 LBS | BODY MASS INDEX: 24.14 KG/M2 | DIASTOLIC BLOOD PRESSURE: 82 MMHG | HEIGHT: 69 IN

## 2021-09-09 VITALS
OXYGEN SATURATION: 98 % | SYSTOLIC BLOOD PRESSURE: 130 MMHG | WEIGHT: 161 LBS | RESPIRATION RATE: 12 BRPM | HEIGHT: 69 IN | HEART RATE: 73 BPM | DIASTOLIC BLOOD PRESSURE: 80 MMHG | BODY MASS INDEX: 23.85 KG/M2

## 2021-09-09 DIAGNOSIS — I50.22 SYSTOLIC CHF, CHRONIC (HCC): ICD-10-CM

## 2021-09-09 DIAGNOSIS — Z98.890 HISTORY OF CARDIOVERSION: ICD-10-CM

## 2021-09-09 DIAGNOSIS — I48.0 PAF (PAROXYSMAL ATRIAL FIBRILLATION) (HCC): Primary | ICD-10-CM

## 2021-09-09 DIAGNOSIS — T46.2X1A HYPOTHYROIDISM DUE TO AMIODARONE: ICD-10-CM

## 2021-09-09 DIAGNOSIS — E03.2 HYPOTHYROIDISM DUE TO AMIODARONE: ICD-10-CM

## 2021-09-09 DIAGNOSIS — I49.5 CHRONOTROPIC INCOMPETENCE WITH SINUS NODE DYSFUNCTION (HCC): ICD-10-CM

## 2021-09-09 DIAGNOSIS — I48.0 PAF (PAROXYSMAL ATRIAL FIBRILLATION) (HCC): ICD-10-CM

## 2021-09-09 DIAGNOSIS — Z95.0 CARDIAC PACEMAKER IN SITU: ICD-10-CM

## 2021-09-09 LAB
AV R PG: 96.92 MMHG
ECHO AO ASC DIAM: 3.62 CM
ECHO AO ROOT DIAM: 3.63 CM
ECHO AR MAX VEL PISA: 492.25 CM/S
ECHO AV AREA PEAK VELOCITY: 2.36 CM2
ECHO AV AREA VTI: 2.3 CM2
ECHO AV AREA/BSA PEAK VELOCITY: 1.3 CM2/M2
ECHO AV AREA/BSA VTI: 1.2 CM2/M2
ECHO AV MEAN GRADIENT: 3.01 MMHG
ECHO AV PEAK GRADIENT: 4.99 MMHG
ECHO AV PEAK VELOCITY: 111.64 CM/S
ECHO AV REGURGITANT PHT: 532.83 MS
ECHO AV VTI: 25.13 CM
ECHO IVC PROX: 0.86 CM
ECHO LA AREA 4C: 29.55 CM2
ECHO LA MAJOR AXIS: 4.35 CM
ECHO LA MINOR AXIS: 2.31 CM
ECHO LA VOL 2C: 115.18 ML (ref 18–58)
ECHO LA VOL 4C: 101.16 ML (ref 18–58)
ECHO LA VOL BP: 119.04 ML (ref 18–58)
ECHO LA VOL/BSA BIPLANE: 63.32 ML/M2 (ref 16–28)
ECHO LA VOLUME INDEX A2C: 61.27 ML/M2 (ref 16–28)
ECHO LA VOLUME INDEX A4C: 53.81 ML/M2 (ref 16–28)
ECHO LV E' LATERAL VELOCITY: 4.39 CM/S
ECHO LV E' SEPTAL VELOCITY: 2.81 CM/S
ECHO LV EDV A2C: 140.73 ML
ECHO LV EDV A4C: 104.69 ML
ECHO LV EDV BP: 122.83 ML (ref 67–155)
ECHO LV EDV INDEX A4C: 55.7 ML/M2
ECHO LV EDV INDEX BP: 65.3 ML/M2
ECHO LV EDV NDEX A2C: 74.9 ML/M2
ECHO LV EJECTION FRACTION A2C: 58 PERCENT
ECHO LV EJECTION FRACTION A4C: 42 PERCENT
ECHO LV EJECTION FRACTION BIPLANE: 50.3 PERCENT (ref 55–100)
ECHO LV ESV A2C: 58.81 ML
ECHO LV ESV A4C: 61.07 ML
ECHO LV ESV BP: 61.04 ML (ref 22–58)
ECHO LV ESV INDEX A2C: 31.3 ML/M2
ECHO LV ESV INDEX A4C: 32.5 ML/M2
ECHO LV ESV INDEX BP: 32.5 ML/M2
ECHO LV INTERNAL DIMENSION DIASTOLIC: 4.43 CM (ref 4.2–5.9)
ECHO LV INTERNAL DIMENSION SYSTOLIC: 3.01 CM
ECHO LV IVSD: 1.25 CM (ref 0.6–1)
ECHO LV MASS 2D: 210 G (ref 88–224)
ECHO LV MASS INDEX 2D: 111.7 G/M2 (ref 49–115)
ECHO LV POSTERIOR WALL DIASTOLIC: 1.29 CM (ref 0.6–1)
ECHO LVOT DIAM: 2.04 CM
ECHO LVOT PEAK GRADIENT: 2.59 MMHG
ECHO LVOT PEAK VELOCITY: 80.46 CM/S
ECHO LVOT SV: 57.7 ML
ECHO LVOT VTI: 17.62 CM
ECHO MV A VELOCITY: 78.98 CM/S
ECHO MV AREA PHT: 7.3 CM2
ECHO MV E DECELERATION TIME (DT): 103.98 MS
ECHO MV E VELOCITY: 49.71 CM/S
ECHO MV E/A RATIO: 0.63
ECHO MV E/E' LATERAL: 11.32
ECHO MV E/E' RATIO (AVERAGED): 14.51
ECHO MV E/E' SEPTAL: 17.69
ECHO MV PRESSURE HALF TIME (PHT): 30.15 MS
ECHO RA MAJOR AXIS: 5.04 CM
ECHO RV INTERNAL DIMENSION: 4.53 CM
ECHO RV TAPSE: 2.86 CM (ref 1.5–2)
ECHO TV REGURGITANT MAX VELOCITY: 259.92 CM/S
ECHO TV REGURGITANT PEAK GRADIENT: 27.02 MMHG
LA VOL DISK BP: 112.58 ML (ref 18–58)
LVOT MG: 1.65 MMHG

## 2021-09-09 PROCEDURE — 1101F PT FALLS ASSESS-DOCD LE1/YR: CPT | Performed by: INTERNAL MEDICINE

## 2021-09-09 PROCEDURE — 93010 ELECTROCARDIOGRAM REPORT: CPT | Performed by: INTERNAL MEDICINE

## 2021-09-09 PROCEDURE — 99214 OFFICE O/P EST MOD 30 MIN: CPT | Performed by: INTERNAL MEDICINE

## 2021-09-09 PROCEDURE — 93306 TTE W/DOPPLER COMPLETE: CPT | Performed by: INTERNAL MEDICINE

## 2021-09-09 PROCEDURE — G8427 DOCREV CUR MEDS BY ELIG CLIN: HCPCS | Performed by: INTERNAL MEDICINE

## 2021-09-09 PROCEDURE — G0463 HOSPITAL OUTPT CLINIC VISIT: HCPCS | Performed by: INTERNAL MEDICINE

## 2021-09-09 PROCEDURE — G8536 NO DOC ELDER MAL SCRN: HCPCS | Performed by: INTERNAL MEDICINE

## 2021-09-09 PROCEDURE — G8432 DEP SCR NOT DOC, RNG: HCPCS | Performed by: INTERNAL MEDICINE

## 2021-09-09 PROCEDURE — 93005 ELECTROCARDIOGRAM TRACING: CPT | Performed by: INTERNAL MEDICINE

## 2021-09-09 PROCEDURE — G8754 DIAS BP LESS 90: HCPCS | Performed by: INTERNAL MEDICINE

## 2021-09-09 PROCEDURE — G8420 CALC BMI NORM PARAMETERS: HCPCS | Performed by: INTERNAL MEDICINE

## 2021-09-09 PROCEDURE — G8752 SYS BP LESS 140: HCPCS | Performed by: INTERNAL MEDICINE

## 2021-09-09 NOTE — PROGRESS NOTES
Chief Complaint   Patient presents with    Follow-up     with echocardiogram.  Denies chest pain/swelling. Improved shortness of breath/continued dizziness. Visit Vitals  /80 (BP 1 Location: Right upper arm, BP Patient Position: Sitting, BP Cuff Size: Adult)   Pulse 73   Resp 12   Ht 5' 9\" (1.753 m)   Wt 161 lb (73 kg)   SpO2 98%   BMI 23.78 kg/m²     Unsure what dose of amlodipine he is taking.

## 2021-09-09 NOTE — TELEPHONE ENCOUNTER
Verified patient with two types of identifiers. Spoke with patient's wife verified on PHI. Notified I will remove Finasteride from medication list. Patient verbalized understanding and will call with any other questions.       Future Appointments   Date Time Provider Marielos Osorio   10/4/2021  1:40 PM Monisha Rodriguez MD NEUSM BS AMB   11/9/2021  9:40 AM MANNIE3LOIS BS AMB   11/9/2021 10:00 AM MD GARCÍA Lay BS AMB

## 2021-09-09 NOTE — TELEPHONE ENCOUNTER
Patient's wife would like to have the finasteride 5 mg medication remove off the patient chart because he no longer takes this medicine.     Phone [phone]

## 2021-09-09 NOTE — PROGRESS NOTES
Cardiac Electrophysiology OFFICE Note     Subjective:       Rejeana Cooks. is a 64 y.o. patient who presents for follow up of PAF, NICM, &  Medtronic dual chamber pacemaker (DOI 07/20/2020).  He is s/p DCCV 04/26/2021. NICM, LVEF 40% via echo in 03/2021; repeat echo pending today.  Preliminary report states LVEF 50-55%.  NYHA II chronic systolic CHF.  On GDMT. BP significantly elevated last month, started amlodipine & increased lisinopril.  Since then, BP better controlled.       Anticoagulated with Xarelto, denies bleeding issues. Previous:   DCCV 04/26/2021. Medtronic dual chamber pacemaker (DOI 07/20/2020). Prostatectomy in 2021. Mild short term memory deficit per PCP notes. Parkinson's disease.      Father had CVA.         Problem List     Fatigue ICD-10-CM: R53.83   ICD-9-CM: 780.79 4/26/2021     Encounter for cardioversion procedure ICD-10-CM: Z01.89   ICD-9-CM: V72.85 4/26/2021   Overview Signed 4/26/2021  1:02 PM by Rogelio Mckeon MD     4/26/2021         Pacemaker ICD-10-CM: Z95.0   ICD-9-CM: V45.01 7/20/2020   Overview Signed 7/20/2020  1:44 PM by Rogelio Mckeon MD     7/20/2020 Medtronic dual chamber         Chronotropic incompetence with sinus node dysfunction ([facility] [address] ICD-10-CM: I49.5   ICD-9-CM: 427.81 7/20/2020     persistent atrial fibrillation ICD-10-CM: I48.0   ICD-9-CM: 427.31 Unknown     Pancytopenia ([address] ICD-10-CM: F98.890   ICD-9-CM: 284.19 12/18/2018     Other neutropenia ([address] ICD-10-CM: D70.8   ICD-9-CM: 288.09 12/15/2017   Overview Signed 12/15/2017  1:40 PM by Bouchra Doe MD     Since 2013 with neg JESSY and normal B12         Bradycardia, sinus ICD-10-CM: R00.1   ICD-9-CM: 427.89 12/21/2015     Bilateral inguinal hernia ICD-10-CM: K40.20   ICD-9-CM: 550.92 12/26/2013     HTN (hypertension) ICD-10-CM: I10   ICD-9-CM: 401.9 9/24/2011     Panic disorder ICD-10-CM: F41.0   ICD-9-CM: 300.01 9/24/2011     Hyperlipidemia ICD-10-CM: E78.5   ICD-9-CM: 272.4 9/24/2011 BPH (benign prostatic hyperplasia) ICD-10-CM: N40.0   ICD-9-CM: 600.00 9/24/2011           Current Outpatient Medications   Medication Sig Dispense Refill   · carbidopa-levodopa (SINEMET)  mg per tablet TAKE 1 TABLET BY MOUTH THREE TIMES DAILY 270 Tablet 0   · amLODIPine (NORVASC) 5 mg tablet Take 1 Tablet by mouth daily. 30 Tablet 5   · lisinopriL (PRINIVIL, ZESTRIL) 40 mg tablet Take 1 Tablet by mouth daily. 90 Tablet 1   · memantine (NAMENDA) 5 mg tablet Take 1 Tablet by mouth two (2) times a day. 60 Tablet 5   · Xarelto 20 mg tab tablet TAKE 1 TABLET BY MOUTH DAILY WITH DINNER 90 Tab 1   · sertraline (ZOLOFT) 100 mg tablet TAKE 1 TABLET BY MOUTH DAILY 90 Tab 1   · finasteride (PROSCAR) 5 mg tablet TK 1 T PO D   · metoprolol succinate (TOPROL-XL) 50 mg XL tablet Take 1 Tab by mouth daily.  80 Tab 3     No Known Allergies   Past Medical History:   Diagnosis Date   · Arthritis   Left knee Probable OA   · Atrial fibrillation, chronic (HCC)   · Bilateral inguinal hernia 12/26/2013   · Bradycardia, sinus 12/21/2015   · Fracture of greater trochanter of left femur ([facility] [address] 12/2020   · HTN (hypertension) 9/24/2011   · Hyperlipidemia 9/24/2011   · Hypertension   · Inguinal hernia   · Other ill-defined conditions(799.89)   high cholesterol   · Other ill-defined conditions(799.89)   benign prostatic hypertrophy   · Other neutropenia ([address] 12/15/2017   Since 2013 with neg JESSY and normal B12   · PAF (paroxysmal atrial fibrillation) (HCC)   · Pancytopenia ([address] 12/18/2018   · Panic disorder 9/24/2011     Past Surgical History:   Procedure Laterality Date   · HX KNEE ARTHROSCOPY   · HX UROLOGICAL   benign prostate bx   · WI CARDIOVERSION ELECTIVE ARRHYTHMIA EXTERNAL N/A 4/26/2021   EP CARDIOVERSION performed by Joann Zimmerman MD at [place] [address], [facility] Dr CATH LAB   · WI INS NEW/RPLCMT PRM PM W/TRANSV ELTRD ATRIAL&VENT 7/20/2020     · [place] INS NEW/RPLCMT PRM PM W/TRANSV ELTRD ATRIAL&VENT N/A 7/20/2020   INSERT PPM DUAL performed by Criss Hauser MD at [address], [facility] [address] TURNER LAB     Family History   Problem Relation Age of Onset   · Stroke Father       76 cva     Social History     Tobacco Use   · Smoking status: Former Smoker   · Smokeless tobacco: Never Used   Substance Use Topics   · Alcohol use: Not Currently         Review of Systems: All other review systems otherwise negative. Constitutional: Negative for fever, chills, + weight loss, no malaise/fatigue. HEENT: Negative for nosebleeds, vision changes. Respiratory: Negative for cough, hemoptysis. Cardiovascular: Negative for chest pain, palpitations, dizziness and no orthopnea, claudication,  leg swelling, no syncope, and no PND.     Gastrointestinal: Negative for nausea, vomiting, diarrhea, blood in stool and melena. Genitourinary: Negative for dysuria, and previous hematuria now resolved. Musculoskeletal: Negative for myalgias, + bilateral knee arthralgia. Skin: Negative for rash. Heme: Does not bleed or bruise easily. Neurological: Negative for speech change and focal weakness.  Poor balance, improved with PT.       Objective:     Visit Vitals   /80 (BP 1 Location: Right upper arm, BP Patient Position: Sitting, BP Cuff Size: Adult)   Pulse 73   Resp 12   Ht 5' 9\" (1.753 m)   Wt 161 lb (73 kg)   SpO2 98%   BMI 23.78 kg/m²       Physical Exam:   Constitutional: Well-developed and well-nourished. No respiratory distress. Head: Normocephalic and atraumatic. Eyes: Pupils are equal, round.  Wearing glasses. ENT: Hearing grossly normal.   Neck: Supple. No JVD present. Cardiovascular: Normal rate, regular rhythm.  Exam reveals no gallop and no friction rub. No murmur heard. Pulmonary/Chest: Effort normal and breath sounds normal. No wheezes. Abdominal: Soft, no tenderness. Musculoskeletal: Moves extremities independently   Vasc/lymphatic: No edema. Neurological: Alert,oriented. Skin: Warm & dry.  Pacemaker site well healed.    Psychiatric: Normal mood and affect. Behavior is normal. Judgment and thought content normal.           Assessment/Plan:     Imaging/Studies:   Echo (09/09/2021, preliminary): LVEF 50-55%, mild concentric LVH, grade 1 LV diastolic dysfunction.  Mildly dilated RV.  Severely dilated LA.  Mod dilated RA.  PFO with left to right shunting.  Mild MS.  Mild AR. Echo (03/24/2021): LVEF 40%.  Mod dilated RV.  Severe SUZE.  Mild MR.  Mild AR.  Mild to mod TR.  Mod PH.  Trivial posterior pericardial effusion. Nuclear stress (06/22/2020): LVEF 50%. Stress echo (06/04/2020): Normal wall motion & LVEF at low workload. ICD-10-CM ICD-9-CM    1. PAF (paroxysmal atrial fibrillation) ([facility])  I48.0 427.31 AMB POC EKG ROUTINE W/ 12 LEADS, INTER & REP   2. History of cardioversion  Z98.890 V15.1 AMB POC EKG ROUTINE W/ 12 LEADS, INTER & REP   3. 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 Chronotropic incompetence with sinus node dysfunction ([facility])  I49.5 427.81    6. Cardiac pacemaker in situ  Z95.0 V45.01      ECG Sinus  Rhythm  -First degree A-V block   -  Nonspecific ST T-abnormality. Medtronic dual chamber pacemaker (DOI 07/20/2020): Device check on 08/18/2021 showed proper lead & generator function.  Generator longevity estimated 12 yrs, 1 mo.  RA 98.96%, RV 0.29%.  Since 05/18/2021, 1 AT/AF episode noted lasting 2 hrs 3 min, well controlled ventricular rates.  AT/AF burden <0.1%. Persistent AF/AFL: S/p DCCV in 05/2021, recurrence as noted above.  Asymptomatic.  Hypothyroid on amiodarone in 06/2021.  Discontinue amiodarone.  Continue to monitor for recurrence, may consider increasing Toprol XL if needed, but not yet.      NICM: LVEF improved to 50-55% per preliminary report of echo today.  NYHA II chronic systolic CHF.  Continue GDMT as previously prescribed.  Consider cardiac MRI in the future to rule out cardiac amyloidosis because of LVH, LAE and mild dementia but none of these are specific to amyloidosis and treatment may or may not change his condition    HTN: BP better controlled with recent changes.  Continue Toprol XL, lisinopril, & amlodipine as previously prescribed. Anticoagulation: Continue Xarelto as previously prescribed for embolic CVA prophylaxis.         EP clinic follow up as noted below. Future Appointments   Date Time Provider Marielos Osorio   10/4/2021 1:40 PM Hekmatdoost, Eilleen Paget, MD NEUSM BS AMB   11/9/2021 9:40 AM LOIS MARTINEZ BS AMB   11/9/2021 10:00 AM MD GARCÍA Vanec BS AMB     Thank you for involving me in this patient's care and please call with further concerns or questions. Yi Caceres M.D.    Electrophysiology/Cardiology   [facility] [place]   [address], [address] [place] [address]   [phone]                                        [phone]

## 2021-09-17 ENCOUNTER — TRANSCRIBE ORDER (OUTPATIENT)
Dept: SCHEDULING | Age: 76
End: 2021-09-17

## 2021-09-17 DIAGNOSIS — M81.0 AGE-RELATED OSTEOPOROSIS WITHOUT CURRENT PATHOLOGICAL FRACTURE: Primary | ICD-10-CM

## 2021-09-24 ENCOUNTER — TELEPHONE (OUTPATIENT)
Dept: CARDIOLOGY CLINIC | Age: 76
End: 2021-09-24

## 2021-09-24 NOTE — TELEPHONE ENCOUNTER
Received labs dated 09/16/2021.     WBC 3.3  Hgb 11.4  Hct 37  Plt 181    AST 27  ALT 12  Tbili 0.49  BUN 29  Cr 1.01  Glu 103  K 4  Na 144    TSH 9.3  T4 6.3  T3 82

## 2021-10-01 ENCOUNTER — HOSPITAL ENCOUNTER (OUTPATIENT)
Dept: MAMMOGRAPHY | Age: 76
Discharge: HOME | End: 2021-10-01
Attending: INTERNAL MEDICINE
Payer: MEDICARE

## 2021-10-01 DIAGNOSIS — M81.0 AGE-RELATED OSTEOPOROSIS WITHOUT CURRENT PATHOLOGICAL FRACTURE: ICD-10-CM

## 2021-10-01 PROCEDURE — 77080 DXA BONE DENSITY AXIAL: CPT

## 2021-10-08 ENCOUNTER — OFFICE VISIT (OUTPATIENT)
Dept: NEUROLOGY | Age: 76
End: 2021-10-08
Payer: MEDICARE

## 2021-10-08 VITALS
WEIGHT: 159.4 LBS | DIASTOLIC BLOOD PRESSURE: 60 MMHG | HEART RATE: 78 BPM | HEIGHT: 69 IN | BODY MASS INDEX: 23.61 KG/M2 | SYSTOLIC BLOOD PRESSURE: 110 MMHG | OXYGEN SATURATION: 98 %

## 2021-10-08 DIAGNOSIS — G20 PARKINSON'S DISEASE (HCC): Primary | ICD-10-CM

## 2021-10-08 PROCEDURE — G8536 NO DOC ELDER MAL SCRN: HCPCS | Performed by: PSYCHIATRY & NEUROLOGY

## 2021-10-08 PROCEDURE — 1101F PT FALLS ASSESS-DOCD LE1/YR: CPT | Performed by: PSYCHIATRY & NEUROLOGY

## 2021-10-08 PROCEDURE — G8510 SCR DEP NEG, NO PLAN REQD: HCPCS | Performed by: PSYCHIATRY & NEUROLOGY

## 2021-10-08 PROCEDURE — G8754 DIAS BP LESS 90: HCPCS | Performed by: PSYCHIATRY & NEUROLOGY

## 2021-10-08 PROCEDURE — G8420 CALC BMI NORM PARAMETERS: HCPCS | Performed by: PSYCHIATRY & NEUROLOGY

## 2021-10-08 PROCEDURE — G8752 SYS BP LESS 140: HCPCS | Performed by: PSYCHIATRY & NEUROLOGY

## 2021-10-08 PROCEDURE — G8427 DOCREV CUR MEDS BY ELIG CLIN: HCPCS | Performed by: PSYCHIATRY & NEUROLOGY

## 2021-10-08 PROCEDURE — 99214 OFFICE O/P EST MOD 30 MIN: CPT | Performed by: PSYCHIATRY & NEUROLOGY

## 2021-10-08 RX ORDER — LEVOTHYROXINE SODIUM 50 UG/1
50 TABLET ORAL DAILY
COMMUNITY
Start: 2021-09-20

## 2021-10-08 RX ORDER — MEMANTINE HYDROCHLORIDE 10 MG/1
10 TABLET ORAL 2 TIMES DAILY
Qty: 60 TABLET | Refills: 5 | Status: SHIPPED | OUTPATIENT
Start: 2021-10-08 | End: 2022-05-04

## 2021-10-08 RX ORDER — CARBIDOPA AND LEVODOPA 25; 100 MG/1; MG/1
1 TABLET ORAL 3 TIMES DAILY
Qty: 90 TABLET | Refills: 5 | Status: SHIPPED | OUTPATIENT
Start: 2021-10-08 | End: 2022-05-31

## 2021-10-08 RX ORDER — CARBIDOPA AND LEVODOPA 25; 100 MG/1; MG/1
1 TABLET, EXTENDED RELEASE ORAL DAILY
COMMUNITY
End: 2021-11-09

## 2021-10-08 NOTE — PROGRESS NOTES
Neurology Clinic Follow up Note    Patient ID:  Stacie Foster  [id number]  64 y.o.  1945      Mr. Lisa Navarro is here for follow up today of  Chief Complaint   Patient presents with    Parkinsons Disease    Follow-up          Last Appointment With Me:  6/18/2021       Interval History:     Interval from prior follow-up patient has been doing reasonably well. Is currently undergoing therapy at [facility] with good response. Otherwise was evaluated by endocrinologist and thyroid function stabilized. Has been doing reasonably well they are completing the move to [place]. Feels that the tremor is at a good place, tolerating current regimen without difficulty. PMHx/ PSHx/ FHx/ SHx:  Reviewed and unchanged previous visit. ROS:  Comprehensive review of systems negative except for as noted above. Objective:       Meds:  Current Outpatient Medications   Medication Sig Dispense Refill    levothyroxine (SYNTHROID) 50 mcg tablet Take 50 mcg by mouth daily.  carbidopa-levodopa ER (SINEMET CR)  mg per tablet 1 tab(s)      memantine (NAMENDA) 10 mg tablet Take 1 Tablet by mouth two (2) times a day. 60 Tablet 5    carbidopa-levodopa (Sinemet)  mg per tablet Take 1 Tablet by mouth three (3) times daily. 90 Tablet 5    sertraline (ZOLOFT) 100 mg tablet TAKE 1 TABLET BY MOUTH DAILY 90 Tablet 1    amLODIPine (NORVASC) 5 mg tablet Take 1 Tablet by mouth daily. 30 Tablet 5    lisinopriL (PRINIVIL, ZESTRIL) 40 mg tablet Take 1 Tablet by mouth daily. 90 Tablet 1    metoprolol succinate (TOPROL-XL) 50 mg XL tablet Take 1 Tab by mouth daily. 90 Tab 3    Xarelto 20 mg tab tablet TAKE 1 TABLET BY MOUTH DAILY WITH DINNER (Patient not taking: Reported on 10/8/2021) 90 Tab 1       Exam:  Visit Vitals  /60   Pulse 78   Ht 5' 9\" (1.753 m)   Wt 159 lb 6.4 oz (72.3 kg)   SpO2 98%   BMI 23.54 kg/m²     Physical exam:  Pleasant male sitting comfortably in exam room in no distress. HEENT appears grossly unremarkable neck appears supple. Cardiopulmonary exams are unremarkable. Abdomen is nondistended. Extremities appear warm/dry. Neurologically patient appears alert and oriented to situation, orientation to day month year not tested. Attention appears partially impaired to casual conversation. Speech is clear, language fluent. Cranial to the 12 are notable for slowing of saccades and smooth pursuits otherwise unremarkable. Motorically patient has grossly 4+ out of 5 strength in upper and lower extremities. High-amplitude, low-frequency tremor noted. Remainder examination is deferred. LABS  Results for orders placed or performed in visit on 09/09/21   ECHO ADULT COMPLETE   Result Value Ref Range    IVSd 1.25 (A) 0.60 - 1.00 cm    LVIDd 4.43 4.20 - 5.90 cm    LVIDs 3.01 cm    LVOT d 2.04 cm    LVPWd 1.29 (A) 0.60 - 1.00 cm    BP EF 50.3 (A) 55.0 - 100.0 percent    LV Ejection Fraction MOD 2C 58 percent    LV Ejection Fraction MOD 4C 42 percent    LV ED Vol A2C 140. 73 mL    LV ED Vol A4C 104.69 mL    LV ED Vol .83 67.0 - 155.0 mL    LV ES Vol A2C 58.81 mL    LV ES Vol A4C 61.07 mL    LV ES Vol BP 61.04 (A) 22.0 - 58.0 mL    LVOT Peak Gradient 2.59 mmHg    Left Ventricular Outflow Tract Mean Gradient 1.65 mmHg    LVOT SV 57.7 mL    LVOT Peak Velocity 80.46 cm/s    LVOT VTI 17.62 cm    RVIDd 4.53 cm    Left Atrium Major Axis 4.35 cm    LA Volume 119.04 18.0 - 58.0 mL    LA Area 4C 29.55 cm2    LA Vol 2C 115.18 (A) 18.00 - 58.00 mL    LA Vol 4C 101.16 (A) 18.00 - 58.00 mL    LA Volume DISK .58 18.0 - 58.0 mL    Right Atrium Major Axis 5.04 cm    Aortic Valve Area by Continuity of Peak Velocity 2.36 cm2    Aortic Valve Area by Continuity of VTI 2.30 cm2    AV R PG 96.92 mmHg    Aortic Regurgitant Pressure Half-time 532.83 ms    AR Max Balbir 492.25 cm/s    AoV PG 4.99 mmHg    Aortic Valve Systolic Mean Gradient 8.39 mmHg    Aortic Valve Systolic Peak Velocity 822.58 cm/s    AoV VTI 25.13 cm    MV A Balbir 78.98 cm/s    Mitral Valve E Wave Deceleration Time 103.98 ms    MV E Balbir 49.71 cm/s    E/E' ratio (averaged) 14.51     E/E' lateral 11.32     E/E' septal 17.69     LV E' Lateral Velocity 4.39 cm/s    LV E' Septal Velocity 2.81 cm/s    Mitral Valve Pressure Half-time 30.15 ms    MVA (PHT) 7.30 cm2    Tapse 2.86 (A) 1.50 - 2.00 cm    Triscuspid Valve Regurgitation Peak Gradient 27.02 mmHg    TR Max Velocity 259.92 cm/s    AO ASC D 3.62 cm    Ao Root D 3.63 cm    IVC proximal 0.86 cm    MV E/A 0.63     LV Mass .0 88.0 - 224.0 g    LV Mass AL Index 111.7 49.0 - 115.0 g/m2    LVES Vol Index BP 32.5 mL/m2    LVED Vol Index BP 65.3 mL/m2    Left Atrium Minor Axis 2.31 cm    LA Vol Index 63.32 16.00 - 28.00 ml/m2    LA Vol Index 61.27 16.00 - 28.00 ml/m2    LA Vol Index 53.81 16.00 - 28.00 ml/m2    LVED Vol Index A4C 55.7 mL/m2    LVED Vol Index A2C 74.9 mL/m2    LVES Vol Index A4C 32.5 mL/m2    LVES Vol Index A2C 31.3 mL/m2    SAMARIA/BSA Pk Balbir 1.3 cm2/m2    SAMARIA/BSA VTI 1.2 cm2/m2       Assessment:     Jb Bautista is a 64year old RH dominant male history idiopathic Parkinson disease complicated by Parkinson's dementia complex who presents to [facility] neurology clinic for follow-up    Plan:   Idiopathic Parkinson's disease:  Reported to be stable, patient endorses good response to physical therapy  Would not change medication regimen, patient request updated physical therapy scription to bring to facility in [place] which was provided  Recommended melatonin as a trial for his component of REM behavior disorder  We will increase memantine to 10 mg twice daily from 5 mg twice daily for off label use of cognitive component    Follow-up in 6 months      Signed:  Julieth Vallecillo MD  10/8/2021  12:03 PM

## 2021-10-14 DIAGNOSIS — I10 ESSENTIAL HYPERTENSION: ICD-10-CM

## 2021-10-14 DIAGNOSIS — R42 DIZZINESS: ICD-10-CM

## 2021-10-14 DIAGNOSIS — R00.1 SINUS BRADYCARDIA BY ELECTROCARDIOGRAM: ICD-10-CM

## 2021-10-14 DIAGNOSIS — I48.0 PAF (PAROXYSMAL ATRIAL FIBRILLATION) (HCC): ICD-10-CM

## 2021-10-14 RX ORDER — RIVAROXABAN 20 MG/1
TABLET, FILM COATED ORAL
Qty: 90 TABLET | Refills: 1 | Status: SHIPPED | OUTPATIENT
Start: 2021-10-14

## 2021-10-14 NOTE — TELEPHONE ENCOUNTER
Received refill request for Xarelto 20 mg po tabs. Refill authorized.     Future Appointments   Date Time Provider Marielos Osorio   11/9/2021  9:40 AM Stefano MARTINEZ BS AMB   11/9/2021 10:00 AM MD GARCÍA Peraza BS AMB   2/24/2022  2:40 PM Stefani Rodriguez MD [facility] BS AMB

## 2021-10-15 ENCOUNTER — TELEPHONE (OUTPATIENT)
Dept: NEUROLOGY | Age: 76
End: 2021-10-15

## 2021-10-15 NOTE — TELEPHONE ENCOUNTER
I left a message for pt/spouse stating Dr. Elva Colbert note does state to increase Memantine from 5mg to 10mg.

## 2021-10-15 NOTE — TELEPHONE ENCOUNTER
----- Message from Annamarie Bullock sent at 10/15/2021  8:51 AM EDT -----  Regarding: Dr. Rodriguez/Telephone  Contact: [phone]  General Message/Vendor Calls    Caller's first and last name: Carmencita Gunn, Wife. Reason for call: PT Rx says 10 MG and is usually 5 MG. Wife wants to make sure this is correct. Curious as to why it went up to 10 MG w/o them knowing. Callback required yes/no and why: Yes, to confirm. Best contact number(s): [phone]. Details to clarify the request: n/a.       Annamarie Bullock

## 2021-11-09 ENCOUNTER — CLINICAL SUPPORT (OUTPATIENT)
Dept: CARDIOLOGY CLINIC | Age: 76
End: 2021-11-09
Payer: MEDICARE

## 2021-11-09 ENCOUNTER — OFFICE VISIT (OUTPATIENT)
Dept: CARDIOLOGY CLINIC | Age: 76
End: 2021-11-09
Payer: MEDICARE

## 2021-11-09 VITALS
DIASTOLIC BLOOD PRESSURE: 60 MMHG | SYSTOLIC BLOOD PRESSURE: 100 MMHG | BODY MASS INDEX: 24.11 KG/M2 | HEART RATE: 60 BPM | RESPIRATION RATE: 12 BRPM | OXYGEN SATURATION: 98 % | HEIGHT: 69 IN | WEIGHT: 162.8 LBS

## 2021-11-09 DIAGNOSIS — Z95.0 CARDIAC PACEMAKER IN SITU: Primary | ICD-10-CM

## 2021-11-09 DIAGNOSIS — I48.92 ATRIAL FLUTTER, UNSPECIFIED TYPE (HCC): ICD-10-CM

## 2021-11-09 DIAGNOSIS — I10 ESSENTIAL HYPERTENSION: ICD-10-CM

## 2021-11-09 DIAGNOSIS — Z79.01 ANTICOAGULATED: ICD-10-CM

## 2021-11-09 DIAGNOSIS — I48.0 PAF (PAROXYSMAL ATRIAL FIBRILLATION) (HCC): ICD-10-CM

## 2021-11-09 DIAGNOSIS — Z98.890 HISTORY OF CARDIOVERSION: ICD-10-CM
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
## 2021-11-09 PROCEDURE — G8754 DIAS BP LESS 90: HCPCS | Performed by: INTERNAL MEDICINE

## 2021-11-09 PROCEDURE — G8420 CALC BMI NORM PARAMETERS: HCPCS | Performed by: INTERNAL MEDICINE

## 2021-11-09 PROCEDURE — G8752 SYS BP LESS 140: HCPCS | Performed by: INTERNAL MEDICINE

## 2021-11-09 PROCEDURE — 93280 PM DEVICE PROGR EVAL DUAL: CPT | Performed by: INTERNAL MEDICINE

## 2021-11-09 PROCEDURE — G0463 HOSPITAL OUTPT CLINIC VISIT: HCPCS | Performed by: INTERNAL MEDICINE

## 2021-11-09 PROCEDURE — 1101F PT FALLS ASSESS-DOCD LE1/YR: CPT | Performed by: INTERNAL MEDICINE

## 2021-11-09 PROCEDURE — G8536 NO DOC ELDER MAL SCRN: HCPCS | Performed by: INTERNAL MEDICINE

## 2021-11-09 PROCEDURE — G8432 DEP SCR NOT DOC, RNG: HCPCS | Performed by: INTERNAL MEDICINE

## 2021-11-09 PROCEDURE — G8427 DOCREV CUR MEDS BY ELIG CLIN: HCPCS | Performed by: INTERNAL MEDICINE

## 2021-11-09 PROCEDURE — 99214 OFFICE O/P EST MOD 30 MIN: CPT | Performed by: INTERNAL MEDICINE

## 2021-11-09 RX ORDER — CHOLECALCIFEROL (VITAMIN D3) 125 MCG
1 CAPSULE ORAL DAILY
COMMUNITY

## 2021-11-09 RX ORDER — DEXTROMETHORPHAN HYDROBROMIDE, GUAIFENESIN 5; 100 MG/5ML; MG/5ML
1300 LIQUID ORAL EVERY 8 HOURS
COMMUNITY

## 2021-11-09 NOTE — PROGRESS NOTES
Chief Complaint   Patient presents with    Follow-up     yearly. Denies chest pain/shortness of breath/dizziness/swelling. Visit Vitals  /60 (BP 1 Location: Left upper arm, BP Patient Position: Sitting, BP Cuff Size: Adult)   Pulse 60   Resp 12   Ht 5' 9\" (1.753 m)   Wt 162 lb 12.8 oz (73.8 kg)   SpO2 98%   BMI 24.04 kg/m²     Recent admission to [facility] after fall. Hema rthrosis of left knee.

## 2021-11-09 NOTE — PATIENT INSTRUCTIONS
Stop Norvasc (Amlodipine)    Stop Xarelto 1 week then resume    You will need to follow up in clinic with Dr. Jennifer Hines in 3 months.

## 2021-11-09 NOTE — PROGRESS NOTES
Cardiac Electrophysiology OFFICE Note     Subjective:       Manisha Phipps is a 64 y.o. patient who presents for follow up of PAF, NICM, &  Medtronic dual chamber pacemaker (DOI 07/20/2020).  He is s/p DCCV 04/26/2021. NICM, LVEF 40% via echo in 03/2021; repeat echo with improved LVEF       ECHO ADULT COMPLETE 09/09/2021 9/9/2021    Interpretation Summary  · LV: Calculated LVEF is 51%. Biplane method used to measure ejection fraction. Normal cavity size and systolic function (ejection fraction normal). Mild concentric hypertrophy. Mild (grade 1) left ventricular diastolic dysfunction. · LA: Severely dilated left atrium. · RV: Mildly dilated right ventricle. Pacing wire present  · RA: Moderately dilated right atrium. · Interatrial Septum: Patent foramen ovale visualized with left to right shunting indicated by spectral Doppler and color flow Doppler. · AV: Mild aortic valve regurgitation is present. · MV: Mitral valve non-specific thickening. · TV: Mild tricuspid valve regurgitation is present. BP lower now   He fell and did not have warning sign  No syncope  Left knee is swollen  He said at local ER they put in needle and said he has traumatic fall with bleeding  Son in law elfego said he is crawling up the steps at home  His wife said he has fallen before    He is using walker now      Anticoagulated with Xarelto       Previous:   DCCV 04/26/2021. Medtronic dual chamber pacemaker (DOI 07/20/2020). Prostatectomy in 2021. Mild short term memory deficit per PCP notes. Parkinson's disease.      Father had CVA.         Problem List     Fatigue ICD-10-CM: R53.83   ICD-9-CM: 780.79 4/26/2021     Encounter for cardioversion procedure ICD-10-CM: Z01.89   ICD-9-CM: V72.85 4/26/2021   Overview Signed 4/26/2021  1:02 PM by Tenzin Gates MD     4/26/2021         Pacemaker ICD-10-CM: Z95.0   ICD-9-CM: V45.01 7/20/2020   Overview Signed 7/20/2020  1:44 PM by Tenzin Gates MD     7/20/2020 Medtronic dual chamber         Chronotropic incompetence with sinus node dysfunction (HCC) ICD-10-CM: I49.5   ICD-9-CM: 427.81 7/20/2020     persistent atrial fibrillation ICD-10-CM: I48.0   ICD-9-CM: 427.31 Unknown     Pancytopenia ([facility] ICD-10-CM: K47.654   ICD-9-CM: 284.19 12/18/2018     Other neutropenia ([facility] ICD-10-CM: D70.8   ICD-9-CM: 288.09 12/15/2017   Overview Signed 12/15/2017  1:40 PM by Jhony Merritt MD     Since 2013 with neg JESSY and normal B12         Bradycardia, sinus ICD-10-CM: R00.1   ICD-9-CM: 427.89 12/21/2015     Bilateral inguinal hernia ICD-10-CM: K40.20   ICD-9-CM: 550.92 12/26/2013     HTN (hypertension) ICD-10-CM: I10   ICD-9-CM: 401.9 9/24/2011     Panic disorder ICD-10-CM: F41.0   ICD-9-CM: 300.01 9/24/2011     Hyperlipidemia ICD-10-CM: E78.5   ICD-9-CM: 272.4 9/24/2011     BPH (benign prostatic hyperplasia) ICD-10-CM: N40.0   ICD-9-CM: 600.00 9/24/2011           Current Outpatient Medications   Medication Sig Dispense Refill   · carbidopa-levodopa (SINEMET)  mg per tablet TAKE 1 TABLET BY MOUTH THREE TIMES DAILY 270 Tablet 0   · amLODIPine (NORVASC) 5 mg tablet Take 1 Tablet by mouth daily. 30 Tablet 5   · lisinopriL (PRINIVIL, ZESTRIL) 40 mg tablet Take 1 Tablet by mouth daily. 90 Tablet 1   · memantine (NAMENDA) 5 mg tablet Take 1 Tablet by mouth two (2) times a day. 60 Tablet 5   · Xarelto 20 mg tab tablet TAKE 1 TABLET BY MOUTH DAILY WITH DINNER 90 Tab 1   · sertraline (ZOLOFT) 100 mg tablet TAKE 1 TABLET BY MOUTH DAILY 90 Tab 1   · finasteride (PROSCAR) 5 mg tablet TK 1 T PO D   · metoprolol succinate (TOPROL-XL) 50 mg XL tablet Take 1 Tab by mouth daily.  80 Tab 3     No Known Allergies   Past Medical History:   Diagnosis Date   · Arthritis   Left knee Probable OA   · Atrial fibrillation, chronic (HCC)   · Bilateral inguinal hernia 12/26/2013   · Bradycardia, sinus 12/21/2015   · Fracture of greater trochanter of left femur ([facility] 12/2020   · HTN (hypertension) 9/24/2011   · Hyperlipidemia 2011   · Hypertension   · Inguinal hernia   · Other ill-defined conditions(799.89)   high cholesterol   · Other ill-defined conditions(799.89)   benign prostatic hypertrophy   · Other neutropenia ([address] 12/15/2017   Since  with neg JESSY and normal B12   · PAF (paroxysmal atrial fibrillation) ([facility]   · Pancytopenia ([facility] 2018   · Panic disorder 2011     Past Surgical History:   Procedure Laterality Date   · HX KNEE ARTHROSCOPY   · HX UROLOGICAL   benign prostate bx   · ME CARDIOVERSION ELECTIVE ARRHYTHMIA EXTERNAL N/A 2021   EP CARDIOVERSION performed by Argentina Yancey MD at [address] CATH LAB   · ME INS NEW/RPLCMT PRM PM W/TRANSV ELTRD ATRIAL&VENT 2020     · ME INS NEW/RPLCMT PRM PM W/TRANSV ELTRD ATRIAL&VENT N/A 2020   INSERT PPM DUAL performed by Argentina Yancey MD at [address] CATH LAB     Family History   Problem Relation Age of Onset   · Stroke Father       76 cva     Social History     Tobacco Use   · Smoking status: Former Smoker   · Smokeless tobacco: Never Used   Substance Use Topics   · Alcohol use: Not Currently         Review of Systems: All other review systems otherwise negative. Constitutional: Negative for fever, chills, + weight loss   HEENT: Negative for nosebleeds, vision changes. Respiratory: Negative for cough, hemoptysis. Cardiovascular: Negative for chest pain, palpitations, dizziness and no orthopnea, claudication,  leg swelling, no syncope, and no PND.     Gastrointestinal: Negative for nausea, vomiting, diarrhea, blood in stool and melena. Genitourinary: Negative for dysuria, and previous hematuria now resolved. Musculoskeletal: Negative for myalgias, + bilateral knee arthralgia. Left knee swelling after fall  Skin: Negative for rash. Heme: Does not bleed or bruise easily.    Neurological: Negative for speech change and focal weakness.  Poor balance, improved with PT.       Objective:   Visit Vitals  /60 (BP 1 Location: Left upper arm, BP Patient Position: Sitting, BP Cuff Size: Adult)   Pulse 60   Resp 12   Ht 5' 9\" (1.753 m)   Wt 162 lb 12.8 oz (73.8 kg)   SpO2 98%   BMI 24.04 kg/m²         Physical Exam:   Constitutional: Well-developed and well-nourished. No respiratory distress. Head: Normocephalic and atraumatic. Eyes: Pupils are equal, round.  Wearing glasses. ENT: Hearing grossly normal.   Neck: Supple. No JVD present. Cardiovascular: Normal rate, regular rhythm.  Exam reveals no gallop and no friction rub. No murmur heard. Pulmonary/Chest: Effort normal and breath sounds normal. No wheezes. Abdominal: Soft, no tenderness. Musculoskeletal: Moves extremities independently and walk with walker  Left knee swollen with a fall  Vasc/lymphatic: No edema. Neurological: Alert,oriented. Skin: Warm & dry.  Pacemaker site well healed. Psychiatric: Normal mood and affect. Behavior is normal. Judgment and thought content normal.           Assessment/Plan:     Imaging/Studies:   Echo (09/09/2021, preliminary): LVEF 50-55%, mild concentric LVH, grade 1 LV diastolic dysfunction. Mildly dilated RV.  Severely dilated LA.  Mod dilated RA.  PFO with left to right shunting.  Mild MS.  Mild AR. Echo (03/24/2021): LVEF 40%.  Mod dilated RV.  Severe SUZE.  Mild MR.  Mild AR.  Mild to mod TR.  Mod PH.  Trivial posterior pericardial effusion. Nuclear stress (06/22/2020): LVEF 50%. Stress echo (06/04/2020): Normal wall motion & LVEF at low workload. ICD-10-CM ICD-9-CM    1. Cardiac pacemaker in situ  Z95.0 V45.01    2. PAF (paroxysmal atrial fibrillation) ([facility])  I48.0 427.31    3. Essential hypertension  I10 401.9    4. History of cardioversion  Z98.890 V15.1    5. Atrial flutter, unspecified type (HCC)  I48.92 427.32    6. Anticoagulated  Z79.01 V58.61    7. Fall, sequela  W19. XXXS 909.4      E929.3         Medtronic dual chamber pacemaker (DOI 07/20/2020): Device check on 08/18/2021 showed proper lead & generator function.    S/p DCCV in 05/2021, recurrence as noted below  Asymptomatic.  Hypothyroid on amiodarone in 06/2021.  Discontinue amiodarone. 1% AFL 22 min  I am concerned about fall and knee bleeding  Will hold xarelto 1 week and he can resume  No active bleeding  He needs to check with PCP in [place] to see if the swelling resolved  In long term I want to consider COLIN closure  I spoke to them today and reevaluate 3 months  Son in law agrees but also wants resumption of xarelto first to see if he can tolerate and I agree     NICM: LVEF improved   Continue with GDMT  May stop norvasc as bp is lower    HTN: see above    Anticoagulation: Continue Xarelto as previously prescribed for embolic CVA prophylaxis.   see above about plan    Future Appointments   Date Time Provider Marielos Osorio   2/15/2022  9:20 AM MD GARCÍA Olivia BS AMB   2/16/2022 10:30 AM REMOTE1, LOIS MARIANO BS AMB   2/24/2022  2:40 PM Greta Rodriguez MD NEUSM BS AMB   5/23/2022  9:45 AM REMOTE1, LOIS MARIANO BS AMB   8/29/2022  3:00 PM REMOTE1, LOIS MARIANO BS AMB   12/8/2022  9:20 AM PACEMAKER3, LOIS MARIANO BS AMB   12/8/2022  9:40 AM MD Tammy Olivia M.D.  [facility]  Electrophysiology/Cardiology  [facility]  [address] [place] [address]                              [phone]

## 2021-12-20 RX ORDER — METOPROLOL SUCCINATE 50 MG/1
TABLET, EXTENDED RELEASE ORAL
Qty: 90 TABLET | Refills: 3 | Status: SHIPPED | OUTPATIENT
Start: 2021-12-20

## 2021-12-20 NOTE — TELEPHONE ENCOUNTER
Received refill request for Toprol XL 50 mg po tabs. Refill authorized.     Future Appointments   Date Time Provider Marielos Osorio   2/15/2022  9:20 AM MD GARCÍA Dudley BS AMB   2/16/2022 10:30 AM REMOTE1, LOIS CH AMB   2/24/2022  2:40 PM Apoorva Rodriguez MD NEUSM BS AMB   5/23/2022  9:45 AM REMOTE1, LOIS MARIANO BS AMB   8/29/2022  3:00 PM REMOTE1, LOIS MARIANO BS AMB   12/8/2022  9:20 AM PACEMAKER3, LOIS MARIANO BS AMB   12/8/2022  9:40 AM MD GARCÍA Dudley BS AMB

## 2021-12-23 ENCOUNTER — TELEPHONE (OUTPATIENT)
Dept: CARDIOLOGY CLINIC | Age: 76
End: 2021-12-23

## 2021-12-23 NOTE — TELEPHONE ENCOUNTER
Patient had a visit with the Ortho doctor today at [facility] and 180/over 100 at 10:30 this morning. States the knee is still swollen, states pain comes and goes.       Ortho doctor is advising that the patient resume taking blood pressure med; please advise            [facility]:[phone]      Transferred to Flako Be

## 2021-12-23 NOTE — TELEPHONE ENCOUNTER
Verified patient with two types of identifiers. Spoke with patient's wife, verified on PHI. Verified information provided below. Verified patient is taking Toprol XL 50 mg daily and lisinopril 40 mg daily. Discussed that Dr. Shaila Snider had stopped Norvasc at the last office visit due to lower blood pressure. Discussed with Dr. Desiree Cortes. No changes to medication at this time. Patient to keep a log of BP- take 2 hours after AM medications, sit for 5 minutes, have arm at heart level. Patient to call back next week with list of blood pressures. Patient's wife verbalized understanding and will call with any other questions.

## 2021-12-30 ENCOUNTER — TELEPHONE (OUTPATIENT)
Dept: CARDIOLOGY CLINIC | Age: 76
End: 2021-12-30

## 2021-12-30 RX ORDER — AMLODIPINE BESYLATE 2.5 MG/1
2.5 TABLET ORAL DAILY
Qty: 90 TABLET | Refills: 1 | Status: SHIPPED | OUTPATIENT
Start: 2021-12-30 | End: 2022-01-11 | Stop reason: SDUPTHER

## 2021-12-30 NOTE — TELEPHONE ENCOUNTER
Spoke to patient's wife. Name noted on permission to release information. Identifiers x 2. Informed of NP recommendation. Verbalized understanding. Requested Prescriptions     Signed Prescriptions Disp Refills    amLODIPine (NORVASC) 2.5 mg tablet 90 Tablet 1     Sig: Take 1 Tablet by mouth daily. Authorizing Provider: KRISH DEUTSCH     Ordering User: Vandana Lemus     Verbal order per Ml Patrick NP. Future Appointments   Date Time Provider Marielos Osorio   2/15/2022  9:20 AM MD GARCÍA Cervantes BS AMB   2/16/2022 10:30 AM REMOTE1, LOIS MARIANO BS AMB   2/24/2022  2:40 PM Claudine Rodriguez MD NEUSM BS AMB   5/23/2022  9:45 AM REMOTE1, LOIS MARIANO BS AMB   8/29/2022  3:00 PM REMOTE1, LOIS MARIANO BS AMB   12/8/2022  9:20 AM PACEMAKER3, LOIS MARIANO BS AMB   12/8/2022  9:40 AM MD GARCÍA Cervantes BS AMB     Spoke to Peter Roberson at [facility]. Requested copy of office note, tests and device implant note be faxed to [phone]. Requested that disk with echo images be mailed to [address] [place] [address] 031; Jhoana Sung. [place] [address] # [phone].

## 2021-12-30 NOTE — TELEPHONE ENCOUNTER
Patient's wife called to give BP and Pulse results as follows:    12/24 - 169/93   No pulse  12/25-  148/89   68  12/26-   148/92  68  12/27- Did not record  12/28- 186/108  85  12/29- 146/91    79    She also left information for where to send records to for Watchman procedure on 01/11/2022.     [facility]  Dr Philipp Jones: Arcadio Villatoro  Phone: [phone]  Did not have fax number available

## 2021-12-30 NOTE — TELEPHONE ENCOUNTER
Images of echo sent via PACs. Copy of disk mailed to Heart [facility] V[facility] address via [facility].

## 2022-01-10 ENCOUNTER — TELEPHONE (OUTPATIENT)
Dept: CARDIOLOGY CLINIC | Age: 77
End: 2022-01-10

## 2022-01-10 NOTE — TELEPHONE ENCOUNTER
Patients spouse Aparna Loveless called to provide BP and pulse readings for the week.        Date       BP         Pulse  12/31 128/82        83  1/1 150/88        71   1/2       146/77        68   1/3       151/102      92  1/4       142/84        69  1/5       152/97        86  1/6       123/78        76 *was a bit unsure of these numbers               1/7       140/85        73  1/8       119/69        62  1/9       149/95        85  1/10     148/93        68      Phone: [phone]

## 2022-01-11 RX ORDER — AMLODIPINE BESYLATE 5 MG/1
5 TABLET ORAL DAILY
Qty: 90 TABLET | Refills: 1
Start: 2022-01-11

## 2022-01-11 NOTE — TELEPHONE ENCOUNTER
Recommend increasing amlodipine to 5 mg po daily. For now, ok to use the 2 of the 2.5 mg tabs per dose just in case he has problems tolerating higher dose. Continue to monitor BP, call back in 1 week to report.

## 2022-01-12 ENCOUNTER — PATIENT MESSAGE (OUTPATIENT)
Dept: CARDIOLOGY CLINIC | Age: 77
End: 2022-01-12

## 2022-01-19 ENCOUNTER — TELEPHONE (OUTPATIENT)
Dept: CARDIOLOGY CLINIC | Age: 77
End: 2022-01-19

## 2022-01-19 NOTE — TELEPHONE ENCOUNTER
Spoke to Ms. Josuelacy Payan on behalf of [facility] - verified on HIPAA. She confirmed they are transferring care to [facility] as they live much closer to there. Accepted transfer request in [address].

## 2022-01-19 NOTE — TELEPHONE ENCOUNTER
Dylan(device nurse) from [address] [place] [address]. called to request if you can check carelink and accept their request for records. The patient is transferring his care over to them.        Phone: [phone]

## 2022-02-16 DIAGNOSIS — I48.19 PERSISTENT ATRIAL FIBRILLATION (HCC): ICD-10-CM

## 2022-02-16 DIAGNOSIS — I10 ESSENTIAL HYPERTENSION: ICD-10-CM

## 2022-02-18 RX ORDER — LISINOPRIL 40 MG/1
40 TABLET ORAL DAILY
Qty: 90 TABLET | Refills: 1 | Status: SHIPPED | OUTPATIENT
Start: 2022-02-18

## 2022-02-18 NOTE — TELEPHONE ENCOUNTER
Received refill request for lisinopril 40 mg po tabs. Refill authorized.     Future Appointments   Date Time Provider Marielos Osorio   2/24/2022  2:40 PM Hekmatdoost, Sherren Cornell, MD [facility] BS AMB

## 2022-02-24 ENCOUNTER — OFFICE VISIT (OUTPATIENT)
Dept: NEUROLOGY | Age: 77
End: 2022-02-24
Payer: MEDICARE

## 2022-02-24 VITALS
WEIGHT: 164 LBS | SYSTOLIC BLOOD PRESSURE: 118 MMHG | HEIGHT: 69 IN | OXYGEN SATURATION: 98 % | HEART RATE: 78 BPM | BODY MASS INDEX: 24.29 KG/M2 | DIASTOLIC BLOOD PRESSURE: 78 MMHG | RESPIRATION RATE: 18 BRPM

## 2022-02-24 DIAGNOSIS — G20 IDIOPATHIC PARKINSON'S DISEASE (HCC): Primary | ICD-10-CM

## 2022-02-24 PROCEDURE — G8427 DOCREV CUR MEDS BY ELIG CLIN: HCPCS | Performed by: PSYCHIATRY & NEUROLOGY

## 2022-02-24 PROCEDURE — G8536 NO DOC ELDER MAL SCRN: HCPCS | Performed by: PSYCHIATRY & NEUROLOGY

## 2022-02-24 PROCEDURE — G8754 DIAS BP LESS 90: HCPCS | Performed by: PSYCHIATRY & NEUROLOGY

## 2022-02-24 PROCEDURE — 99214 OFFICE O/P EST MOD 30 MIN: CPT | Performed by: PSYCHIATRY & NEUROLOGY

## 2022-02-24 PROCEDURE — 1101F PT FALLS ASSESS-DOCD LE1/YR: CPT | Performed by: PSYCHIATRY & NEUROLOGY

## 2022-02-24 PROCEDURE — G8420 CALC BMI NORM PARAMETERS: HCPCS | Performed by: PSYCHIATRY & NEUROLOGY

## 2022-02-24 PROCEDURE — G8510 SCR DEP NEG, NO PLAN REQD: HCPCS | Performed by: PSYCHIATRY & NEUROLOGY

## 2022-02-24 PROCEDURE — G8752 SYS BP LESS 140: HCPCS | Performed by: PSYCHIATRY & NEUROLOGY

## 2022-02-24 RX ORDER — GUAIFENESIN 100 MG/5ML
81 LIQUID (ML) ORAL DAILY
COMMUNITY

## 2022-02-24 NOTE — LETTER
2/24/2022    Patient: Yuniel Arias. YOB: 1945   Date of Visit: 2/24/2022     0 [facility], [address] [place] [address] Justin Irizarry    Dear [address] [facility], MD,      Thank you for referring Mr. Joanna Pederson to [address] [place] [address] for evaluation. My notes for this consultation are attached. If you have questions, please do not hesitate to call me. I look forward to following your patient along with you.       Sincerely,    Kp Messina MD

## 2022-02-24 NOTE — PROGRESS NOTES
Mar Saldana. is a 64 y.o. male  HIPAA verified by two patient identifiers. Health Maintenance Due   Topic    Hepatitis C Screening     DTaP/Tdap/Td series (1 - Tdap)    Medicare Yearly Exam      Chief Complaint   Patient presents with    Follow-up     parkinson's     Visit Vitals  /78   Pulse 78   Resp 18   Ht 5' 9\" (1.753 m)   Wt 164 lb (74.4 kg)   SpO2 98%   BMI 24.22 kg/m²       Pain Scale: 7/10  Pain Location: Generalized  1. Have you been to the ER, urgent care clinic since your last visit? Hospitalized since your last visit? No    2. Have you seen or consulted any other health care providers outside of the [address] [place] [address] since your last visit? Include any pap smears or colon screening.  No

## 2022-02-24 NOTE — PROGRESS NOTES
Neurology Clinic Follow up Note    Patient ID:  Edi Woodall  [id number]  64 y.o.  1945      Mr. Eleuterio Flynn is here for follow up today of  Chief Complaint   Patient presents with    Follow-up     parkinson's          Last Appointment With Me:  10/8/2021       Interval History: In interval from last follow-up, patient has been doing reasonably well. Underwent watchman procedure in transfer which was uneventful is to be off Eliquis in about 3 weeks. Continues to have issue with left knee pain hoping to have surgery after off anticoagulation through orthopedics. No falls. Feels that memory remains an issue but not significantly decline, no falls noted tremors reported to be intermittent and subjectively improved. PMHx/ PSHx/ FHx/ SHx:  Reviewed and unchanged previous visit. ROS:  Comprehensive review of systems negative except for as noted above. Objective:       Meds:  Current Outpatient Medications   Medication Sig Dispense Refill    apixaban (ELIQUIS) 5 mg tablet Take 5 mg by mouth two (2) times a day.  aspirin 81 mg chewable tablet Take 81 mg by mouth daily.  lisinopriL (PRINIVIL, ZESTRIL) 40 mg tablet TAKE 1 TABLET BY MOUTH DAILY 90 Tablet 1    amLODIPine (NORVASC) 5 mg tablet Take 1 Tablet by mouth daily. 90 Tablet 1    metoprolol succinate (TOPROL-XL) 50 mg XL tablet TAKE 1 TABLET BY MOUTH DAILY 90 Tablet 3    cholecalciferol, vitamin D3, (Vitamin D3) 50 mcg (2,000 unit) tab Take 1 Tablet by mouth daily.  acetaminophen (Tylenol Arthritis Pain) 650 mg TbER Take 1,300 mg by mouth every eight (8) hours.  Xarelto 20 mg tab tablet TAKE 1 TABLET BY MOUTH DAILY WITH DINNER 90 Tablet 1    levothyroxine (SYNTHROID) 50 mcg tablet Take 50 mcg by mouth daily.  memantine (NAMENDA) 10 mg tablet Take 1 Tablet by mouth two (2) times a day. 60 Tablet 5    carbidopa-levodopa (Sinemet)  mg per tablet Take 1 Tablet by mouth three (3) times daily.  90 Tablet 5    sertraline (ZOLOFT) 100 mg tablet TAKE 1 TABLET BY MOUTH DAILY 90 Tablet 1       Exam:  Visit Vitals  /78   Pulse 78   Resp 18   Ht 5' 9\" (1.753 m)   Wt 164 lb (74.4 kg)   SpO2 98%   BMI 24.22 kg/m²     Pleasant male resting audibly in exam room in no distress. HEENT appears grossly unremarkable neck appears supple. Cardiopulmonary exams are unremarkable. Abdomen is nondistended. Extremities are warm/dry. Neurologically, patient appears alert and oriented attention appears intact. Speech is clear, language intermittently fluent. Cranial nerves II through XII are notable for slowing of saccadic eye movements. Motorically patient has cogwheel rigidity at the wrists right greater than left strength otherwise grossly 4+ out of 5 in upper and lower extremities. Low frequency and amplitude tremors noted in right hand greater than left intermittently. Sensation appears reasonably intact. Coordination is intact upper extremities. Primary gait and station is slow with use of walker but not ataxic without clear shuffling. LABS  Results for orders placed or performed in visit on 09/09/21   ECHO ADULT COMPLETE   Result Value Ref Range    IVSd 1.25 (A) 0.60 - 1.00 cm    LVIDd 4.43 4.20 - 5.90 cm    LVIDs 3.01 cm    LVOT Diameter 2.04 cm    LVPWd 1.29 (A) 0.60 - 1.00 cm    EF BP 50.3 (A) 55.0 - 100.0 percent    LV Ejection Fraction A2C 58 percent    LV Ejection Fraction A4C 42 percent    LV EDV A2C 140. 73 mL    LV EDV A4C 104.69 mL    LV EDV .83 67.0 - 155.0 mL    LV ESV A2C 58.81 mL    LV ESV A4C 61.07 mL    LV ESV BP 61.04 (A) 22.0 - 58.0 mL    LVOT Peak Gradient 2.59 mmHg    Left Ventricular Outflow Tract Mean Gradient 1.65 mmHg    LVOT SV 57.7 mL    LVOT Peak Velocity 80.46 cm/s    LVOT VTI 17.62 cm    RVIDd 4.53 cm    LA Major Axis 4.35 cm    LA Volume .04 18.0 - 58.0 mL    LA Area 4C 29.55 cm2    LA Volume 2C 115.18 (A) 18.00 - 58.00 mL    LA Volume 4C 101.16 (A) 18.00 - 58.00 mL    LA Volume DISK .58 18.0 - 58.0 mL    RA Major [place] 5.04 cm    AV Area by Peak Velocity 2.36 cm2    AV Area by VTI 2.30 cm2    AV R PG 96.92 mmHg    AR .83 ms    AR Max Velocity PISA 492.25 cm/s    AV Peak Gradient 4.99 mmHg    AV Mean Gradient 3.01 mmHg    AV Peak Velocity 111.64 cm/s    AV VTI 25.13 cm    MV A Velocity 78.98 cm/s    MV E Wave Deceleration Time 103.98 ms    MV E Velocity 49.71 cm/s    E/E' Ratio (Averaged) 14.51     E/E' Lateral 11.32     E/E' Septal 17.69     LV E' Lateral Velocity 4.39 cm/s    LV E' Septal Velocity 2.81 cm/s    MV PHT 30.15 ms    MV Area by PHT 7.30 cm2    TAPSE 2.86 (A) 1.50 - 2.00 cm    TR Peak Gradient 27.02 mmHg    TR Max Velocity 259.92 cm/s    Ascending Aorta 3.62 cm    Aortic Root 3.63 cm    IVC Proxmal 0.86 cm    MV E/A 0.63     LV Mass 2D 210.0 88 - 224 g    LV Mass 2D Index 111.7 49 - 115 g/m2    LV ESV Index BP 32.5 mL/m2    LV EDV Index BP 65.3 mL/m2    LA Minor Axis 2.31 cm    LA Volume Index BP 63.32 16 - 28 ml/m2    LA Volume Index 2C 61.27 16 - 28 ml/m2    LA Volume Index 4C 53.81 16 - 28 ml/m2    LV EDV Index A4C 55.7 mL/m2    LV EDV Index A2C 74.9 mL/m2    LV ESV Index A4C 32.5 mL/m2    LV ESV Index A2C 31.3 mL/m2    SAMARIA/BSA Peak Velocity 1.3 cm2/m2    SAMARIA/BSA VTI 1.2 cm2/m2       Assessment:     Shanon Diaz is a 64year old right-hand-dominant gentleman who presents to [facility] neurology clinic for ongoing evaluation and management of idiopathic Parkinson disease    Plan:   Idiopathic Parkinson's disease:  Remains stable, REM behavior disorder is reported to have dissipated over the years, tremor is intermittent reported to be improved  No falls, family friend is looking to organize a Parkinson's support group hopefully with movement therapy as discussed with family  Encourage patient, wife to inquire about movement disorder specialist/neurology care in [place] to avoid an excessively long trip  Patient remains on memantine somewhat off label use they feel it may has help with memory will continue for now  Again discussed using memantine only if needed for REM behavior disorder    Follow-up in 6 months if care not yet established in [address] than 30 minutes spent during this visit of which greater than 50% time was engaged in counseling and coordination of care    Signed:  Isak Foley MD  2/24/2022  2:51 PM

## 2022-03-03 ENCOUNTER — TELEPHONE (OUTPATIENT)
Dept: NEUROLOGY | Age: 77
End: 2022-03-03

## 2022-03-03 NOTE — TELEPHONE ENCOUNTER
Patients wife called to get clinicals faxed over to new Provider.        [address] Neurology in [facility]: [phone]

## 2022-03-19 PROBLEM — D61.818 PANCYTOPENIA (HCC): Status: ACTIVE | Noted: 2018-12-18

## 2022-03-19 PROBLEM — D70.8 OTHER NEUTROPENIA (HCC): Status: ACTIVE | Noted: 2017-12-15

## 2022-03-19 PROBLEM — Z01.89 ENCOUNTER FOR CARDIOVERSION PROCEDURE: Status: ACTIVE | Noted: 2021-04-26

## 2022-03-19 PROBLEM — Z95.0 PACEMAKER: Status: ACTIVE | Noted: 2020-07-20

## 2022-03-19 PROBLEM — R53.83 FATIGUE: Status: ACTIVE | Noted: 2021-04-26

## 2022-03-20 PROBLEM — I49.5 CHRONOTROPIC INCOMPETENCE WITH SINUS NODE DYSFUNCTION (HCC): Status: ACTIVE | Noted: 2020-07-20

## 2022-05-04 RX ORDER — MEMANTINE HYDROCHLORIDE 10 MG/1
TABLET ORAL
Qty: 60 TABLET | Refills: 5 | Status: SHIPPED | OUTPATIENT
Start: 2022-05-04 | End: 2022-05-31

## 2022-05-31 RX ORDER — MEMANTINE HYDROCHLORIDE 10 MG/1
TABLET ORAL
Qty: 60 TABLET | Refills: 5 | Status: SHIPPED | OUTPATIENT
Start: 2022-05-31

## 2022-05-31 RX ORDER — CARBIDOPA AND LEVODOPA 25; 100 MG/1; MG/1
TABLET ORAL
Qty: 90 TABLET | Refills: 5 | Status: SHIPPED | OUTPATIENT
Start: 2022-05-31

## 2022-11-29 RX ORDER — MEMANTINE HYDROCHLORIDE 10 MG/1
TABLET ORAL
Qty: 60 TABLET | Refills: 5 | Status: SHIPPED | OUTPATIENT
Start: 2022-11-29

## 2022-12-12 RX ORDER — METOPROLOL SUCCINATE 50 MG/1
TABLET, EXTENDED RELEASE ORAL
Qty: 90 TABLET | Refills: 3 | Status: SHIPPED | OUTPATIENT
Start: 2022-12-12

## 2022-12-12 NOTE — TELEPHONE ENCOUNTER
Received refill request for Toprol XL 50 mg po tabs. Refill authorized. No appointment pending, last seen in 11/2021. Please schedule patient for follow up sometime in the next 6 months. No future appointments.

## 2022-12-13 NOTE — TELEPHONE ENCOUNTER
Per chart review, patient transferred pacemaker/ICD care to [facility].   Further refills can be sent to current cardiologist.

## 2023-05-23 RX ORDER — LISINOPRIL 40 MG/1
40 TABLET ORAL DAILY
COMMUNITY
Start: 2022-02-18

## 2023-05-23 RX ORDER — AMLODIPINE BESYLATE 5 MG/1
5 TABLET ORAL DAILY
COMMUNITY
Start: 2022-01-11

## 2023-05-23 RX ORDER — METOPROLOL SUCCINATE 50 MG/1
1 TABLET, EXTENDED RELEASE ORAL DAILY
COMMUNITY
Start: 2022-12-12

## 2023-05-23 RX ORDER — SERTRALINE HYDROCHLORIDE 100 MG/1
1 TABLET, FILM COATED ORAL DAILY
COMMUNITY
Start: 2021-09-12

## 2023-05-23 RX ORDER — ASPIRIN 81 MG/1
81 TABLET, CHEWABLE ORAL DAILY
COMMUNITY

## 2023-05-23 RX ORDER — LEVOTHYROXINE SODIUM 0.05 MG/1
50 TABLET ORAL DAILY
COMMUNITY
Start: 2021-09-20

## 2023-05-23 RX ORDER — SENNOSIDES 8.6 MG
1300 CAPSULE ORAL EVERY 8 HOURS
COMMUNITY

## 2023-05-23 RX ORDER — MEMANTINE HYDROCHLORIDE 10 MG/1
1 TABLET ORAL 2 TIMES DAILY
COMMUNITY
Start: 2022-11-29

## 2023-12-05 RX ORDER — METOPROLOL SUCCINATE 50 MG/1
50 TABLET, EXTENDED RELEASE ORAL DAILY
Qty: 90 TABLET | Refills: 0 | Status: SHIPPED | OUTPATIENT
Start: 2023-12-05

## 2023-12-05 NOTE — TELEPHONE ENCOUNTER
Request for Toprol 50 mg. Last office visit 11/9/21, next office visit to be scheduled.  Refills per verbal order from Narinder Campbell NP.

## 2024-10-23 NOTE — TELEPHONE ENCOUNTER
Verified patient with two types of identifiers. Spoke with patient's wife verified on PHI. Notified of NP recommendations. Wife states they have a lot of 5 mg left over so no need to send new prescription to pharmacy. Will update medication list. Patient has an appointment with an EP MD in [facility] this morning. Notified wife if they are planning to follow up with that EP then we can release from our system and cancel upcoming appts. Wife will return call. Patient verbalized understanding and will call with any other questions. Add 52 Modifier (Optional): no Post-Care Instructions: I reviewed with the patient in detail post-care instructions. Patient is to wear sunprotection, and avoid picking at any of the treated lesions. Pt may apply Vaseline to crusted or scabbing areas. Treatment Number (Will Not Render If 0): 0 Detail Level: Detailed Medical Necessity Clause: This procedure was medically necessary because the lesions that were treated were: Medical Necessity Information: It is in your best interest to select a reason for this procedure from the list below. All of these items fulfill various CMS LCD requirements except the new and changing color options. Consent: The patient's consent was obtained including but not limited to risks of crusting, scabbing, blistering, scarring, darker or lighter pigmentary change, recurrence, incomplete removal and infection.

## (undated) DEVICE — DERMABOND SKIN ADH 0.7ML -- DERMABOND ADVANCED 12/BX

## (undated) DEVICE — DECANTER BAG 9": Brand: MEDLINE INDUSTRIES, INC.

## (undated) DEVICE — 3M™ IOBAN™ 2 ANTIMICROBIAL INCISE DRAPE 6650EZ: Brand: IOBAN™ 2

## (undated) DEVICE — SUTURE DEV SZ 3-0 V-LOC 90 L12IN TO L18IN CV-23 VLT VLOCM0844

## (undated) DEVICE — BULB SYRINGE, IRRIGATION WITH PROTECTIVE CAP, 60 CC, INDIVIDUALLY WRAPPED: Brand: DOVER

## (undated) DEVICE — PACEMAKER PACK: Brand: MEDLINE INDUSTRIES, INC.

## (undated) DEVICE — Device: Brand: PADPRO

## (undated) DEVICE — INTRO SHTH 7FR 13X20CM -- TEARAWAY

## (undated) DEVICE — SLING ORTHOPEDIC PCH UNIV 19.5X9 IN 2-39 IN ARM W/ FOAM STRP

## (undated) DEVICE — SUTURE ETHBND EXCEL SZ 2 L30IN NONABSORBABLE GRN L40MM V-37 MX69G

## (undated) DEVICE — LIMB HOLDER, WRIST/ANKLE: Brand: DEROYAL

## (undated) DEVICE — REM POLYHESIVE ADULT PATIENT RETURN ELECTRODE: Brand: VALLEYLAB

## (undated) DEVICE — INTRO SHTH 9FR 13X20CM -- USE ITEM# 341577

## (undated) DEVICE — ELECTRODE,RADIOTRANSLUCENT,FOAM,5PK: Brand: MEDLINE

## (undated) DEVICE — SUTURE V-LOC 180 SZ 2-0 L9IN ABSRB VLT GS-21 L37MM 1/2 CIR VLOCM0345

## (undated) DEVICE — CANNULA NSL ORAL AD FOR CAPNOFLEX CO2 O2 AIRLFE

## (undated) DEVICE — CABLE PACE ALGTR CLP SAF 12FT --

## (undated) DEVICE — DRESSING MEPILEX BORDER POST OP AG 4 X 6

## (undated) DEVICE — APPLICATOR MEDICATED 10.5 CC SOLUTION CLR STRL CHLORAPREP